# Patient Record
Sex: FEMALE | Race: BLACK OR AFRICAN AMERICAN | Employment: FULL TIME | URBAN - METROPOLITAN AREA
[De-identification: names, ages, dates, MRNs, and addresses within clinical notes are randomized per-mention and may not be internally consistent; named-entity substitution may affect disease eponyms.]

---

## 2017-11-28 ENCOUNTER — OP HISTORICAL/CONVERTED ENCOUNTER (OUTPATIENT)
Dept: OTHER | Age: 67
End: 2017-11-28

## 2018-01-24 ENCOUNTER — OP HISTORICAL/CONVERTED ENCOUNTER (OUTPATIENT)
Dept: OTHER | Age: 68
End: 2018-01-24

## 2018-04-11 ENCOUNTER — OFFICE VISIT (OUTPATIENT)
Dept: ENDOCRINOLOGY | Age: 68
End: 2018-04-11

## 2018-04-11 VITALS
RESPIRATION RATE: 18 BRPM | DIASTOLIC BLOOD PRESSURE: 71 MMHG | WEIGHT: 181.3 LBS | SYSTOLIC BLOOD PRESSURE: 135 MMHG | HEIGHT: 63 IN | OXYGEN SATURATION: 97 % | TEMPERATURE: 97.1 F | BODY MASS INDEX: 32.12 KG/M2 | HEART RATE: 76 BPM

## 2018-04-11 DIAGNOSIS — M81.0 SENILE OSTEOPOROSIS: Primary | ICD-10-CM

## 2018-04-11 DIAGNOSIS — E07.9 THYROID DISEASE: ICD-10-CM

## 2018-04-11 DIAGNOSIS — E55.9 VITAMIN D DEFICIENCY: ICD-10-CM

## 2018-04-11 RX ORDER — METFORMIN HYDROCHLORIDE 500 MG/1
TABLET, EXTENDED RELEASE ORAL
Refills: 0 | COMMUNITY
Start: 2018-04-03

## 2018-04-11 RX ORDER — AMLODIPINE BESYLATE 10 MG/1
TABLET ORAL
Refills: 1 | COMMUNITY
Start: 2018-02-09

## 2018-04-11 RX ORDER — LISINOPRIL 5 MG/1
TABLET ORAL
Refills: 0 | COMMUNITY
Start: 2018-02-09

## 2018-04-11 RX ORDER — MELOXICAM 15 MG/1
TABLET ORAL
Refills: 1 | COMMUNITY
Start: 2018-02-17 | End: 2021-07-19

## 2018-04-11 NOTE — PATIENT INSTRUCTIONS
24 hour urine collection instructions    On the day you plan to collect urine    1. Discard first urine sample soon after you get up    2. Start collecting urine samples in the container then on whole first day . Minor Ronde ... 3. until the first sample next day is collected into the jar.

## 2018-04-11 NOTE — MR AVS SNAPSHOT
[address] [id number] 
[phone] Patient: Kayla Palomo MRN: [id number] [facility]:8/55[birth year]6756 Visit Information Date & Time Provider Department Dept. Phone Encounter #  
 4/11/2018 10:00 AM Claudene Maffucci, MD [facility] [phone] [id number] Follow-up Instructions Return in about 2 months (around 6/11/2018). Upcoming Health Maintenance Date Due Hepatitis C Screening 1950 DTaP/Tdap/Td series (1 - Tdap) 3/24/1969 BREAST CANCER SCRN MAMMOGRAM 3/24/1998 FOBT Q 1 YEAR AGE 50-75 3/24/1998 ZOSTER VACCINE AGE 60> 1/24/2008 GLAUCOMA SCREENING Q2Y 3/24/2013 Bone Densitometry (Dexa) Screening 3/24/2013 Pneumococcal 65+ Low/Medium Risk (1 of 2 - PCV13) 3/24/2013 Influenza Age 5 to Adult 8/1/2017 MEDICARE YEARLY EXAM 3/14/2018 Allergies as of 4/11/2018  Review Complete On: 4/11/2018 By: Claudene Maffucci, MD  
 No Known Allergies Current Immunizations  Never Reviewed No immunizations on file. Not reviewed this visit You Were Diagnosed With   
  
 Codes Comments Senile osteoporosis    -  Primary ICD-10-CM: M81.0 ICD-9-CM: 733.01 Thyroid disease     ICD-10-CM: E07.9 ICD-9-CM: 246. 9 Vitamin D deficiency     ICD-10-CM: E55.9 ICD-9-CM: 268.9 Vitals BP Pulse Temp Resp Height(growth percentile) Weight(growth percentile) 135/71 (BP 1 Location: Right arm, BP Patient Position: Sitting) 76 97.1 °F (36.2 °C) (Oral) 18 5' 3\" (1.6 m) 181 lb 4.8 oz (82.2 kg) SpO2 BMI OB Status Smoking Status 97% 32.12 kg/m2 Postmenopausal Light Tobacco Smoker Vitals History BMI and BSA Data Body Mass Index Body Surface Area  
 32.12 kg/m 2 1.91 m 2 Your Updated Medication List  
  
   
This list is accurate as of 4/11/18 11:04 AM.  Always use your most recent med list. amLODIPine 10 mg tablet Commonly known as:  Meagan Osvaldo TAKE 1 TABLET EVERY DAY  
  
 lisinopril 5 mg tablet Commonly known as:  PRINIVIL, ZESTRIL  
TAKE 1 TABLET EVERY DAY  
  
 meloxicam 15 mg tablet Commonly known as:  MOBIC  
TAKE 1 TABLET EVERY DAY  
  
 metFORMIN  mg tablet Commonly known as:  GLUCOPHAGE XR  
TAKE 1 TABLET TWICE DAILY We Performed the Following ALKALINE PHOSPHATASE, BONE [70151 CPT(R)] IMMUNOFIXATION ELECT. URINE [OAJ7093 Custom] METABOLIC PANEL, COMPREHENSIVE [16661 CPT(R)] N-TELOPEPTIDE, URINE (SERIAL) D9855363 CPT(R)] PTH INTACT [28808 CPT(R)] THYROID PEROXIDASE (TPO) AB [35075 CPT(R)] TSH 3RD GENERATION [93362 CPT(R)] VITAMIN D, 25 HYDROXY T1522707 CPT(R)] Follow-up Instructions Return in about 2 months (around 6/11/2018). Patient Instructions 24 hour urine collection instructions On the day you plan to collect urine 1. Discard first urine sample soon after you get up 2. Start collecting urine samples in the container then on whole first day . Daylin Jennings ... 3. until the first sample next day is collected into the jar. Introducing \[facility]\"" & HEALTH SERVICES! Lisa Lau introduces [employer] patient portal. Now you can access parts of your medical record, email your doctor's office, and request medication refills online. 1. In your internet browser, go to [email or web address] 2. Click on the First Time User? Click Here link in the Sign In box. You will see the New Member Sign Up page. 3. Enter your [employer] Access Code exactly as it appears below. You will not need to use this code after youve completed the sign-up process. If you do not sign up before the expiration date, you must request a new code. · [employer] Access Code: [email or web address] Expires: 7/10/2018 10:06 AM 
 
4. Enter the last four digits of your Social Security Number (xxxx) and Date of Birth (mm/dd/yyyy) as indicated and click Submit.  You will be taken to the next sign-up page. 5. Create a EsLife ID. This will be your [employer] login ID and cannot be changed, so think of one that is secure and easy to remember. 6. Create a EsLife password. You can change your password at any time. 7. Enter your Password Reset Question and Answer. This can be used at a later time if you forget your password. 8. Enter your e-mail address. You will receive e-mail notification when new information is available in [address]. 9. Click Sign Up. You can now view and download portions of your medical record. 10. Click the Download Summary menu link to download a portable copy of your medical information. If you have questions, please visit the Frequently Asked Questions section of the [employer]Life website. Remember, [employer]Life is NOT to be used for urgent needs. For medical emergencies, dial 911. Now available from your iPhone and Android! Please provide this summary of care documentation to your next provider. If you have any questions after today's visit, please call [phone].

## 2018-04-11 NOTE — PROGRESS NOTES
HISTORY OF PRESENT ILLNESS  Sanam Boudreaux is a 77 y.o. female. HPI  Initial visit for osteoporosis management   Referred by pcp       Osteoporosis  Patient complains of osteoporosis. She was diagnosed with osteoporosis by bone density scan in nov 2017 . Patient denies history of fracture. The cause of osteoporosis is felt to be due to postmenopausal estrogen deficiency. She is currently being treated with calcium and vitamin D supplementation. She is currently being treated with bisphosphonates   Osteoporosis Risk Factors   Nonmodifiable  Personal Hx of fracture as an adult: no  Hx of fracture in first-degree relative: no   race: no  Advanced age: yes  Female sex: yes  Dementia: no  Poor health/frailty: no     Potentially modifiable:  Tobacco use: no  Low body weight (<127 lbs): no  Estrogen deficiency     early menopause (age <45) or bilateral ovariectomy: no     prolonged premenopausal amenorrhea (>1 yr): no  Low calcium intake (lifelong): no  Alcoholism: no  Recurrent falls: no  Inadequate physical activity: no          Past Medical History:   Diagnosis Date    High cholesterol     Hypertension     Osteoporosis        Social History     Social History    Marital status:      Spouse name: N/A    Number of children: N/A    Years of education: N/A     Occupational History    Not on file. Social History Main Topics    Smoking status: Light Tobacco Smoker    Smokeless tobacco: Never Used    Alcohol use Yes    Drug use: No    Sexual activity: Not on file     Other Topics Concern    Not on file     Social History Narrative    No narrative on file       History reviewed. No pertinent family history. Review of Systems   Constitutional: Negative. HENT: Negative. Eyes: Negative for pain and redness. Respiratory: Negative. Cardiovascular: Negative for chest pain, palpitations and leg swelling. Gastrointestinal: Negative. Negative for constipation. Genitourinary: Negative. Musculoskeletal: Positive for back pain, joint pain and myalgias. Skin: Negative. Neurological: Negative. Endo/Heme/Allergies: Negative. Psychiatric/Behavioral: Negative for depression and memory loss. The patient does not have insomnia. Physical Exam   Constitutional: She is oriented to person, place, and time. She appears well-developed and well-nourished. HENT:   Head: Normocephalic. Eyes: Conjunctivae and EOM are normal. Pupils are equal, round, and reactive to light. Neck: Normal range of motion. Neck supple. No JVD present. No tracheal deviation present. No thyromegaly present. Cardiovascular: Normal rate, regular rhythm and normal heart sounds. No murmur heard. Pulmonary/Chest: Breath sounds normal.   Abdominal: Soft. Bowel sounds are normal.   Musculoskeletal: Normal range of motion. Lymphadenopathy:     She has no cervical adenopathy. Neurological: She is alert and oriented to person, place, and time. She has normal reflexes. Skin: Skin is warm. Psychiatric: She has a normal mood and affect. ASSESSMENT and PLAN      1. Osteoporosis :   Osteoporosis :  Date : nov 2017  Bone DEXA  ap spine T-score -2.9; left femoral Neck T- score  -2.4, right femoral neck T-score -2.5  Date : jan 19 2012  Bone DEXA  ap spine T-score -1.5 ( L2 is -2.5); left femoral Neck T- score  -2.2, right femoral neck T-score-2.3    Discussed  Options of current available medications. Will do primary screening for secondary causes of osteoprosis (thyroid, parathyroid, hypercalciuria, vitamin D def, multiple myeloma, celiac sprue). Discussed  starting on Prolia  twice yearly once insurance approval is done. Medication benefits, side-effects and adverse effects discussed in detail with patient and family.   Patient is agreeable and started her on First shot today     Fall precautions discussed with the patient   Patient will continue on high dose supplementation of vitamin D and calcium at least 1200mg a day      Continue with calcium (1000mg - 1200mg) and vit D supplements(800iu- 1200iu) daily.        2. H/o Breast cancer diagnosed in   2009   Had radiation treatments   Tamoxifen ended 2014         > 50 % of time is spent on counseling   Patient voiced understanding her plan of care

## 2018-04-11 NOTE — PROGRESS NOTES
Wt Readings from Last 3 Encounters:   04/11/18 181 lb 4.8 oz (82.2 kg)     Temp Readings from Last 3 Encounters:   04/11/18 97.1 °F (36.2 °C) (Oral)     BP Readings from Last 3 Encounters:   04/11/18 135/71     Pulse Readings from Last 3 Encounters:   04/11/18 76

## 2018-04-13 LAB
25(OH)D3+25(OH)D2 SERPL-MCNC: 35.4 NG/ML (ref 30–100)
ALBUMIN SERPL-MCNC: 4.3 G/DL (ref 3.5–4.8)
ALBUMIN/GLOB SERPL: 1.3 {RATIO} (ref 1.2–2.2)
ALP BONE SERPL-MCNC: 12.9 UG/L
ALP SERPL-CCNC: 56 IU/L (ref 39–117)
ALT SERPL-CCNC: 18 IU/L (ref 0–32)
AST SERPL-CCNC: 20 IU/L (ref 0–40)
BILIRUB SERPL-MCNC: 0.4 MG/DL (ref 0–1.2)
BUN SERPL-MCNC: 9 MG/DL (ref 8–27)
BUN/CREAT SERPL: 16 (ref 12–28)
CALCIUM SERPL-MCNC: 9.2 MG/DL (ref 8.7–10.3)
CHLORIDE SERPL-SCNC: 103 MMOL/L (ref 96–106)
CO2 SERPL-SCNC: 30 MMOL/L (ref 18–29)
COLLAGEN NTX UR-SCNC: 423 NMOL BCE
COLLAGEN NTX/CREAT UR-SRTO: 61 NM BCE/MM CR (ref 0–89)
CREAT SERPL-MCNC: 0.57 MG/DL (ref 0.57–1)
CREAT UR-MCNC: 78.4 MG/DL
GFR SERPLBLD CREATININE-BSD FMLA CKD-EPI: 109 ML/MIN/1.73
GFR SERPLBLD CREATININE-BSD FMLA CKD-EPI: 94 ML/MIN/1.73
GLOBULIN SER CALC-MCNC: 3.2 G/DL (ref 1.5–4.5)
GLUCOSE SERPL-MCNC: 99 MG/DL (ref 65–99)
INTERPRETATION UR IFE-IMP: NORMAL
INTERPRETIVE GUIDE, 511495: NORMAL
POTASSIUM SERPL-SCNC: 3.6 MMOL/L (ref 3.5–5.2)
PROT SERPL-MCNC: 7.5 G/DL (ref 6–8.5)
PTH-INTACT SERPL-MCNC: 32 PG/ML (ref 15–65)
SODIUM SERPL-SCNC: 145 MMOL/L (ref 134–144)
THYROPEROXIDASE AB SERPL-ACNC: 204 IU/ML (ref 0–34)
TSH SERPL DL<=0.005 MIU/L-ACNC: 1.46 UIU/ML (ref 0.45–4.5)

## 2018-04-17 NOTE — PROGRESS NOTES
Informed patient that she has all the labs which have come back to be normal except for thyroid antibodies which are generally present at certain level in all of us.    so,  none to worry about

## 2018-04-23 ENCOUNTER — TELEPHONE (OUTPATIENT)
Dept: ENDOCRINOLOGY | Age: 68
End: 2018-04-23

## 2018-06-13 ENCOUNTER — OFFICE VISIT (OUTPATIENT)
Dept: ENDOCRINOLOGY | Age: 68
End: 2018-06-13

## 2018-06-13 VITALS
BODY MASS INDEX: 31.96 KG/M2 | SYSTOLIC BLOOD PRESSURE: 142 MMHG | OXYGEN SATURATION: 98 % | WEIGHT: 180.4 LBS | HEIGHT: 63 IN | RESPIRATION RATE: 18 BRPM | DIASTOLIC BLOOD PRESSURE: 72 MMHG | TEMPERATURE: 97.3 F | HEART RATE: 78 BPM

## 2018-06-13 DIAGNOSIS — C50.912 MALIGNANT NEOPLASM OF LEFT FEMALE BREAST, UNSPECIFIED ESTROGEN RECEPTOR STATUS, UNSPECIFIED SITE OF BREAST (HCC): ICD-10-CM

## 2018-06-13 DIAGNOSIS — M81.0 SENILE OSTEOPOROSIS: Primary | ICD-10-CM

## 2018-06-13 DIAGNOSIS — R82.994 HYPERCALCIURIA: ICD-10-CM

## 2018-06-13 RX ORDER — HYDROCHLOROTHIAZIDE 25 MG/1
25 TABLET ORAL DAILY
Qty: 30 TAB | Refills: 6 | Status: SHIPPED | OUTPATIENT
Start: 2018-06-13 | End: 2019-01-14 | Stop reason: SDUPTHER

## 2018-06-13 NOTE — MR AVS SNAPSHOT
[address] [facility] [address] [place] [address] [facility] [address] 
[phone] Patient: Margy Mccain MRN: [id number] LBY:[birth year] Visit Information Date & Time Provider Department Dept. Phone Encounter #  
 6/13/2018 10:00 AM Mao Nicole MD [facility] [phone] [id number] Follow-up Instructions Return for [address] 2018 . Your Appointments 10/10/2018 10:00 AM  
IMMUNIZATIONS/INJECTIONS with Mao Nicole MD  
[facility] Appt Note: Prolia injection [address] [address] [place] [address] [facility] [address]  
[phone]  
  
   
 [address] [place] [address] [address] [facility] [address] Upcoming Health Maintenance Date Due Hepatitis C Screening 1950 DTaP/Tdap/Td series (1 - Tdap) 3/24/1969 BREAST CANCER SCRN MAMMOGRAM 3/24/1998 FOBT Q 1 YEAR AGE 50-75 3/24/1998 ZOSTER VACCINE AGE 60> 1/24/2008 GLAUCOMA SCREENING Q2Y 3/24/2013 Pneumococcal 65+ Low/Medium Risk (1 of 2 - PCV13) 3/24/2013 Influenza Age 5 to Adult 8/1/2018 Allergies as of 6/13/2018  Review Complete On: 6/13/2018 By: Mao Nicole MD  
 No Known Allergies Current Immunizations  Never Reviewed No immunizations on file. Not reviewed this visit You Were Diagnosed With   
  
 Codes Comments Senile osteoporosis    -  Primary ICD-10-CM: M81.0 ICD-9-CM: 733.01 Hypercalciuria     ICD-10-CM: E83.50 ICD-9-CM: 275.40 Malignant neoplasm of left female breast, unspecified estrogen receptor status, unspecified site of breast [facility]     ICD-10-CM: X67.501 ICD-9-CM: 174.9 Vitals BP Pulse Temp Resp Height(growth percentile) Weight(growth percentile) 142/72 (BP 1 Location: Left arm, BP Patient Position: Sitting) 78 97.3 °F (36.3 °C) (Oral) 18 5' 3\" (1.6 m) 180 lb 6.4 oz (81.8 kg) SpO2 BMI OB Status Smoking Status 98% 31.96 kg/m2 Postmenopausal Light Tobacco Smoker Vitals History BMI and BSA Data Body Mass Index Body Surface Area 31.96 kg/m 2 1.91 m 2 Preferred Pharmacy Pharmacy Name Phone [facility]/PHARMACY #[id number] NABILA Blackmon  [place] [address] [phone] Your Updated Medication List  
  
   
This list is accurate as of 6/13/18 10:38 AM.  Always use your most recent med list. amLODIPine 10 mg tablet Commonly known as:  Ermias Emerald TAKE 1 TABLET EVERY DAY  
  
 hydroCHLOROthiazide 25 mg tablet Commonly known as:  HYDRODIURIL Take 1 Tab by mouth daily. lisinopril 5 mg tablet Commonly known as:  PRINIVIL, ZESTRIL  
TAKE 1 TABLET EVERY DAY  
  
 meloxicam 15 mg tablet Commonly known as:  MOBIC  
TAKE 1 TABLET EVERY DAY  
  
 metFORMIN  mg tablet Commonly known as:  GLUCOPHAGE XR  
TAKE 1 TABLET TWICE DAILY Prescriptions Sent to Pharmacy Refills  
 hydroCHLOROthiazide (HYDRODIURIL) 25 mg tablet 6 Sig: Take 1 Tab by mouth daily. Class: Normal  
 Pharmacy: [facility]/pharmacy #[id number] [place], [facility] [address] [place] [address] Ph #: [phone] Route: Oral  
  
Follow-up Instructions Return for [address] 2018 . Patient Instructions   
-------------------------------------------------------------------------------------------- Refills    -    please call your pharmacy and have them send us a refill request 
 
Results  -  allow up to a week for lab results to be processed and reviewed. Phone calls  -  Allow upto 24 hrs. for non-urgent calls to be retained Prior authorization - It may take up to 2 weeks to process, depending on your insurance Forms  -  FMLA, DMV, patient assistance, etc. will take up to 2 weeks to process Cancellations - please notify the office in advance if you cannot keep your appointment Samples  - will only be dispensed at visits as supply is limited If you are having a medical emergency call 911 
 
-------------------------------------------------------------------------------------------- 
 
 
START ON HCTZ 25 MG A DAY 24 hour urine collection instructions FOR CALCIUM, CREATITNEN AND SODIUM On the day you plan to collect urine 1. Discard first urine sample soon after you get up 2. Start collecting urine samples in the container then on whole first day . Jason Morris ... 3. until the first sample next day is collected into the jar. Introducing [facility] & HEALTH SERVICES! Justin Leal introduces [employer] patient portal. Now you can access parts of your medical record, email your doctor's office, and request medication refills online. 1. In your internet browser, go to [email or web address] 2. Click on the First Time User? Click Here link in the Sign In box. You will see the New Member Sign Up page. 3. Enter your [employer] Access Code exactly as it appears below. You will not need to use this code after youve completed the sign-up process. If you do not sign up before the expiration date, you must request a new code. · [employer] Access Code: [email or web address] Expires: 7/10/2018 10:06 AM 
 
4. Enter the last four digits of your Social Security Number (xxxx) and Date of Birth (mm/dd/yyyy) as indicated and click Submit. You will be taken to the next sign-up page. 5. Create a [employer] ID. This will be your [employer] login ID and cannot be changed, so think of one that is secure and easy to remember. 6. Create a [employer] password. You can change your password at any time. 7. Enter your Password Reset Question and Answer. This can be used at a later time if you forget your password. 8. Enter your e-mail address. You will receive e-mail notification when new information is available in [address] 9. Click Sign Up. You can now view and download portions of your medical record. 10. Click the Download Summary menu link to download a portable copy of your medical information. If you have questions, please visit the Frequently Asked Questions section of the [employer] website. Remember, [employer] is NOT to be used for urgent needs. For medical emergencies, dial 911. Now available from your iPhone and Android! Please provide this summary of care documentation to your next provider. Your primary care clinician is listed as Geovani Banerjee. If you have any questions after today's visit, please call [phone].

## 2018-06-13 NOTE — LETTER
6/13/2018 7:38 PM 
 
Patient:  rBady Garibay YOB: 1950 Date of Visit: 6/13/2018 Dear Jennifer Galan MD 
[address] [place] [address] VIA Facsimile: [phone] 
 : Thank you for referring Ms. Trixie Vivas to me for evaluation/treatment. Below are the relevant portions of my assessment and plan of care. Wt Readings from Last 3 Encounters:  
06/13/18 180 lb 6.4 oz (81.8 kg) 04/11/18 181 lb 4.8 oz (82.2 kg) Temp Readings from Last 3 Encounters:  
06/13/18 97.3 °F (36.3 °C) (Oral) 04/11/18 97.1 °F (36.2 °C) (Oral) BP Readings from Last 3 Encounters:  
06/13/18 142/72  
04/11/18 135/71 Pulse Readings from Last 3 Encounters:  
06/13/18 78  
04/11/18 76 HISTORY OF PRESENT ILLNESS Brady Garibay is a 77 y.o. female. HPI First f/u after  Last Initial visit for osteoporosis management   From April 11 2018 Tolerated Prolia shot well Did the 24 hr urine testing Old history :  
Referred by pcp Osteoporosis Patient complains of osteoporosis. She was diagnosed with osteoporosis by bone density scan in nov 2017 . Patient denies history of fracture. The cause of osteoporosis is felt to be due to postmenopausal estrogen deficiency. She is currently being treated with calcium and vitamin D supplementation. She is currently being treated with bisphosphonates Osteoporosis Risk Factors Nonmodifiable Personal Hx of fracture as an adult: no 
Hx of fracture in first-degree relative: no 
 race: no 
Advanced age: yes Female sex: yes Dementia: no 
Poor health/frailty: no 
  
Potentially modifiable: 
Tobacco use: no Low body weight (<127 lbs): no 
Estrogen deficiency 
   early menopause (age <45) or bilateral ovariectomy: no 
   prolonged premenopausal amenorrhea (>1 yr): no 
Low calcium intake (lifelong): no 
Alcoholism: no 
Recurrent falls: no Inadequate physical activity: no 
 
 
 
 
Past Medical History: Diagnosis Date  High cholesterol  Hypertension  Osteoporosis Social History Social History  Marital status:  Spouse name: N/A  
 Number of children: N/A  
 Years of education: N/A Occupational History  Not on file. Social History Main Topics  Smoking status: Light Tobacco Smoker  Smokeless tobacco: Never Used  Alcohol use Yes  Drug use: No  
 Sexual activity: Not on file Other Topics Concern  Not on file Social History Narrative History reviewed. No pertinent family history. Review of Systems Constitutional: Negative. HENT: Negative. Eyes: Negative for pain and redness. Respiratory: Negative. Cardiovascular: Negative for chest pain, palpitations and leg swelling. Gastrointestinal: Negative. Negative for constipation. Genitourinary: Negative. Musculoskeletal: Positive for back pain, joint pain and myalgias. Skin: Negative. Neurological: Negative. Endo/Heme/Allergies: Negative. Psychiatric/Behavioral: Negative for depression and memory loss. The patient does not have insomnia. Physical Exam  
Constitutional: She is oriented to person, place, and time. She appears well-developed and well-nourished. HENT:  
Head: Normocephalic. Eyes: Conjunctivae and EOM are normal. Pupils are equal, round, and reactive to light. Neck: Normal range of motion. Neck supple. No JVD present. No tracheal deviation present. thyromegaly present. Cardiovascular: Normal rate, regular rhythm and normal heart sounds. No murmur heard. Pulmonary/Chest: Breath sounds normal.  
Abdominal: Soft. Bowel sounds are normal.  
Musculoskeletal: Normal range of motion. Lymphadenopathy:  
  She has no cervical adenopathy. Neurological: She is alert and oriented to person, place, and time. She has normal reflexes. Skin: Skin is warm. Psychiatric: She has a normal mood and affect. Reviewed labs Has 24 hr urine  Calcium - over 400 mg  
 
ASSESSMENT and PLAN 
 
 
1. Osteoporosis :  
Osteoporosis :  Date : nov 2017  Bone DEXA  ap spine T-score -2.9; left femoral Neck T- score  -2.4, right femoral neck T-score -2.5 Date : jan 19 2012  Bone DEXA  ap spine T-score -1.5 ( L2 is -2.5); left femoral Neck T- score  -2.2, right femoral neck T-score-2.3 AGE, tamoxifen ,AND HYPERCALCIURIA Discussed  Options of current available medications. DID primary screening for secondary causes of osteopOrosis (thyroid, parathyroid,  CAME POSITIVE FOR hypercalciuria, vitamin D def, multiple myeloma, celiac sprue). Discussed  starting on Prolia  twice yearly once insurance approval is done. Medication benefits, side-effects and adverse effects discussed in detail with patient and family. Patient is agreeable and started her on First shot today Fall precautions discussed with the patient Patient will continue on high dose supplementation of vitamin D and calcium at least 1200mg a day Continue with calcium (1000mg - 1200mg) and vit D supplements(800iu- 1200iu) daily. 2. H/o Breast cancer, left side  diagnosed in   2009 Had radiation treatments and lumpectomy Tamoxifen ended 2014  
 
 
3. HYPERCALCIURIA  : MORE THAN 400 IN 24 HOURS  
START ON HCTZ 25 MG A DAY Will repeat 24 hr calcium after 2 months on hctz ASKED HER TO REDUCE NORVASC OR STOP IT IF SHE FELT DIZZY 4. Thyromegaly - will do an usg 5. DM 2 - managed by pcp  
 
 
> 50 % of time is spent on counseling Patient voiced understanding her plan of care If you have questions, please do not hesitate to call me. I look forward to following Ms. Carmelita Monique along with you. Sincerely, Nader Chappell MD

## 2018-06-13 NOTE — PATIENT INSTRUCTIONS
--------------------------------------------------------------------------------------------    Refills    -    please call your pharmacy and have them send us a refill request    Results  -  allow up to a week for lab results to be processed and reviewed. Phone calls  -  Allow upto 24 hrs. for non-urgent calls to be retained    Prior authorization - It may take up to 2 weeks to process, depending on your insurance    Forms  -  FMLA, DMV, patient assistance, etc. will take up to 2 weeks to process    Cancellations - please notify the office in advance if you cannot keep your appointment    Samples  - will only be dispensed at visits as supply is limited      If you are having a medical emergency call 911    --------------------------------------------------------------------------------------------      START ON HCTZ 25 MG A DAY       24 hour urine collection instructions FOR CALCIUM, CREATITNEN AND SODIUM     On the day you plan to collect urine    1. Discard first urine sample soon after you get up    2. Start collecting urine samples in the container then on whole first day . Baxter Dials ... 3. until the first sample next day is collected into the jar.

## 2018-06-13 NOTE — PROGRESS NOTES
HISTORY OF PRESENT ILLNESS  Xu Willams is a 77 y.o. female. HPI  First f/u after  Last Initial visit for osteoporosis management   From April 11 2018     Tolerated Prolia shot well   Did the 24 hr urine testing         Old history :   Referred by pcp       Osteoporosis  Patient complains of osteoporosis. She was diagnosed with osteoporosis by bone density scan in nov 2017 . Patient denies history of fracture. The cause of osteoporosis is felt to be due to postmenopausal estrogen deficiency. She is currently being treated with calcium and vitamin D supplementation. She is currently being treated with bisphosphonates   Osteoporosis Risk Factors   Nonmodifiable  Personal Hx of fracture as an adult: no  Hx of fracture in first-degree relative: no   race: no  Advanced age: yes  Female sex: yes  Dementia: no  Poor health/frailty: no     Potentially modifiable:  Tobacco use: no  Low body weight (<127 lbs): no  Estrogen deficiency     early menopause (age <45) or bilateral ovariectomy: no     prolonged premenopausal amenorrhea (>1 yr): no  Low calcium intake (lifelong): no  Alcoholism: no  Recurrent falls: no  Inadequate physical activity: no          Past Medical History:   Diagnosis Date    High cholesterol     Hypertension     Osteoporosis        Social History     Social History    Marital status:      Spouse name: N/A    Number of children: N/A    Years of education: N/A     Occupational History    Not on file. Social History Main Topics    Smoking status: Light Tobacco Smoker    Smokeless tobacco: Never Used    Alcohol use Yes    Drug use: No    Sexual activity: Not on file     Other Topics Concern    Not on file     Social History Narrative       History reviewed. No pertinent family history. Review of Systems   Constitutional: Negative. HENT: Negative. Eyes: Negative for pain and redness. Respiratory: Negative.     Cardiovascular: Negative for chest pain, palpitations and leg swelling. Gastrointestinal: Negative. Negative for constipation. Genitourinary: Negative. Musculoskeletal: Positive for back pain, joint pain and myalgias. Skin: Negative. Neurological: Negative. Endo/Heme/Allergies: Negative. Psychiatric/Behavioral: Negative for depression and memory loss. The patient does not have insomnia. Physical Exam   Constitutional: She is oriented to person, place, and time. She appears well-developed and well-nourished. HENT:   Head: Normocephalic. Eyes: Conjunctivae and EOM are normal. Pupils are equal, round, and reactive to light. Neck: Normal range of motion. Neck supple. No JVD present. No tracheal deviation present. thyromegaly present. Cardiovascular: Normal rate, regular rhythm and normal heart sounds. No murmur heard. Pulmonary/Chest: Breath sounds normal.   Abdominal: Soft. Bowel sounds are normal.   Musculoskeletal: Normal range of motion. Lymphadenopathy:     She has no cervical adenopathy. Neurological: She is alert and oriented to person, place, and time. She has normal reflexes. Skin: Skin is warm. Psychiatric: She has a normal mood and affect. Reviewed labs   Has 24 hr urine  Calcium - over 400 mg     ASSESSMENT and PLAN      1. Osteoporosis :   Osteoporosis :  Date : nov 2017  Bone DEXA  ap spine T-score -2.9; left femoral Neck T- score  -2.4, right femoral neck T-score -2.5  Date : jan 19 2012  Bone DEXA  ap spine T-score -1.5 ( L2 is -2.5); left femoral Neck T- score  -2.2, right femoral neck T-score-2.3    AGE, tamoxifen ,AND HYPERCALCIURIA      Discussed  Options of current available medications. DID primary screening for secondary causes of osteopOrosis (thyroid, parathyroid,  CAME POSITIVE FOR hypercalciuria, vitamin D def, multiple myeloma, celiac sprue). Discussed  starting on Prolia  twice yearly once insurance approval is done.  Medication benefits, side-effects and adverse effects discussed in detail with patient and family. Patient is agreeable and started her on First shot today     Fall precautions discussed with the patient   Patient will continue on high dose supplementation of vitamin D and calcium at least 1200mg a day      Continue with calcium (1000mg - 1200mg) and vit D supplements(800iu- 1200iu) daily. 2. H/o Breast cancer, left side  diagnosed in   2009   Had radiation treatments and lumpectomy   Tamoxifen ended 2014       3. HYPERCALCIURIA  : MORE THAN 400 IN 24 HOURS   START ON HCTZ 25 MG A DAY   Will repeat 24 hr calcium after 2 months on hctz  ASKED HER TO REDUCE NORVASC OR STOP IT IF SHE FELT DIZZY       4. Thyromegaly - will do an usg     5.   DM 2 - managed by pcp       > 50 % of time is spent on counseling   Patient voiced understanding her plan of care

## 2018-06-13 NOTE — PROGRESS NOTES
Wt Readings from Last 3 Encounters:   06/13/18 180 lb 6.4 oz (81.8 kg)   04/11/18 181 lb 4.8 oz (82.2 kg)     Temp Readings from Last 3 Encounters:   06/13/18 97.3 °F (36.3 °C) (Oral)   04/11/18 97.1 °F (36.2 °C) (Oral)     BP Readings from Last 3 Encounters:   06/13/18 142/72   04/11/18 135/71     Pulse Readings from Last 3 Encounters:   06/13/18 78   04/11/18 76

## 2018-08-29 ENCOUNTER — TELEPHONE (OUTPATIENT)
Dept: ENDOCRINOLOGY | Age: 68
End: 2018-08-29

## 2018-08-29 NOTE — TELEPHONE ENCOUNTER
Dorothy called stated prolia for patient needs to be done Medically in order to pay. Any questions call dorothy back.  Thank you

## 2018-08-30 ENCOUNTER — DOCUMENTATION ONLY (OUTPATIENT)
Dept: ENDOCRINOLOGY | Age: 68
End: 2018-08-30

## 2018-08-30 NOTE — PROGRESS NOTES
Prolia approved 09/19/18-09/0919 case #[id number]. This had to be approved through medical benefits with medical management. Medication will need to be scheduled for shipment on or after 09/19/18 and the patient's appointment is for 10/11/18.

## 2018-09-25 ENCOUNTER — DOCUMENTATION ONLY (OUTPATIENT)
Dept: ENDOCRINOLOGY | Age: 68
End: 2018-09-25

## 2018-10-02 ENCOUNTER — TELEPHONE (OUTPATIENT)
Dept: ENDOCRINOLOGY | Age: 68
End: 2018-10-02

## 2018-10-02 NOTE — TELEPHONE ENCOUNTER
Contacted [facility]([phone]) and gave information regarding PROLIA. Just need to fax script to [phone]. After which they will see if patient is eligible based on her benefits. They will then call patient and then us to set up delivery. The process takes a few days.  Shipment is sent via UPS overnight,

## 2018-10-02 NOTE — TELEPHONE ENCOUNTER
Contacted [facility]; representative stated that they do not have any info in system for Prolia injection.

## 2018-10-03 ENCOUNTER — TELEPHONE (OUTPATIENT)
Dept: ENDOCRINOLOGY | Age: 68
End: 2018-10-03

## 2018-10-03 DIAGNOSIS — M81.0 SENILE OSTEOPOROSIS: Primary | ICD-10-CM

## 2018-10-03 DIAGNOSIS — E01.0 THYROMEGALY: ICD-10-CM

## 2018-10-04 LAB
25(OH)D3+25(OH)D2 SERPL-MCNC: 38.5 NG/ML (ref 30–100)
ALBUMIN SERPL-MCNC: 4.1 G/DL (ref 3.5–4.8)
ALBUMIN/GLOB SERPL: 1.2 {RATIO} (ref 1.2–2.2)
ALP SERPL-CCNC: 49 IU/L (ref 39–117)
ALT SERPL-CCNC: 14 IU/L (ref 0–32)
AST SERPL-CCNC: 16 IU/L (ref 0–40)
BILIRUB SERPL-MCNC: 0.3 MG/DL (ref 0–1.2)
BUN SERPL-MCNC: 13 MG/DL (ref 8–27)
BUN/CREAT SERPL: 21 (ref 12–28)
CALCIUM SERPL-MCNC: 8.8 MG/DL (ref 8.7–10.3)
CHLORIDE SERPL-SCNC: 103 MMOL/L (ref 96–106)
CO2 SERPL-SCNC: 25 MMOL/L (ref 20–29)
CREAT SERPL-MCNC: 0.61 MG/DL (ref 0.57–1)
GLOBULIN SER CALC-MCNC: 3.4 G/DL (ref 1.5–4.5)
GLUCOSE SERPL-MCNC: 121 MG/DL (ref 65–99)
POTASSIUM SERPL-SCNC: 3.6 MMOL/L (ref 3.5–5.2)
PROT SERPL-MCNC: 7.5 G/DL (ref 6–8.5)
PTH-INTACT SERPL-MCNC: 44 PG/ML (ref 15–65)
SODIUM SERPL-SCNC: 143 MMOL/L (ref 134–144)
T4 FREE SERPL-MCNC: 0.77 NG/DL (ref 0.82–1.77)
THYROPEROXIDASE AB SERPL-ACNC: 192 IU/ML (ref 0–34)
TSH SERPL DL<=0.005 MIU/L-ACNC: 0.88 UIU/ML (ref 0.45–4.5)

## 2018-10-08 ENCOUNTER — DOCUMENTATION ONLY (OUTPATIENT)
Dept: ENDOCRINOLOGY | Age: 68
End: 2018-10-08

## 2018-10-08 ENCOUNTER — TELEPHONE (OUTPATIENT)
Dept: ENDOCRINOLOGY | Age: 68
End: 2018-10-08

## 2018-10-08 DIAGNOSIS — C50.912 MALIGNANT NEOPLASM OF LEFT FEMALE BREAST, UNSPECIFIED ESTROGEN RECEPTOR STATUS, UNSPECIFIED SITE OF BREAST (HCC): ICD-10-CM

## 2018-10-08 DIAGNOSIS — M81.0 SENILE OSTEOPOROSIS: ICD-10-CM

## 2018-10-08 DIAGNOSIS — R82.994 HYPERCALCIURIA: ICD-10-CM

## 2018-10-08 NOTE — PROGRESS NOTES
[facility] was transfer to a rep who informed writer that pts medical benefit need to be checked along with pharmacy then they will call patient about co pay and then us about shipment and deliveryday. It should take a day or two per representaive.

## 2018-10-08 NOTE — TELEPHONE ENCOUNTER
Call placed to [facility] [phone] regarding Prolia injection. Representative stated they never received prescription via fax. Prescription faxed again to [phone] per representative. Rep states that they will reach out to patient and then we will hear from [facility].

## 2018-10-08 NOTE — PROGRESS NOTES
CONTACTED patient in regards to PROLIA-pt states that she received a letter from [facility] stating she is good for 2doses of prolia from 9/2018 till 9/2019. Ms Charmaine Hughes states she will bring letter with her on her appointment.  Patient states she has not received any phone calls that she is aware

## 2018-10-09 LAB
CALCIUM 24H UR-MCNC: 15.3 MG/DL
CALCIUM 24H UR-MRATE: 214.2 MG/24 HR (ref 100–300)
CREAT 24H UR-MRATE: 965 MG/24 HR (ref 800–1800)
CREAT UR-MCNC: 68.9 MG/DL
SODIUM 24H UR-SCNC: 128 MMOL/L
SODIUM 24H UR-SRATE: 179 MMOL/24 HR (ref 39–258)

## 2018-10-10 ENCOUNTER — TELEPHONE (OUTPATIENT)
Dept: ENDOCRINOLOGY | Age: 68
End: 2018-10-10

## 2018-10-10 NOTE — TELEPHONE ENCOUNTER
Specialty pharmacy states they must get in contact with pt in order to ship prolia. Tried to reach pt. Left message for a return phone call.  If unable to reach pt, she will not be able to receive prolia injection at appointment tomorrow

## 2018-10-10 NOTE — TELEPHONE ENCOUNTER
Please contact Lele Melvin with [facility] specialty pharmacy at   [phone] ext. [phone]    Re: Nancie Kincaid

## 2018-10-11 ENCOUNTER — OFFICE VISIT (OUTPATIENT)
Dept: ENDOCRINOLOGY | Age: 68
End: 2018-10-11

## 2018-10-11 VITALS
DIASTOLIC BLOOD PRESSURE: 76 MMHG | TEMPERATURE: 97.3 F | SYSTOLIC BLOOD PRESSURE: 130 MMHG | HEIGHT: 63 IN | HEART RATE: 76 BPM | BODY MASS INDEX: 31.41 KG/M2 | WEIGHT: 177.3 LBS | RESPIRATION RATE: 18 BRPM | OXYGEN SATURATION: 97 %

## 2018-10-11 DIAGNOSIS — M81.0 SENILE OSTEOPOROSIS: Primary | ICD-10-CM

## 2018-10-11 DIAGNOSIS — R82.994 HYPERCALCIURIA: ICD-10-CM

## 2018-10-11 DIAGNOSIS — E04.9 THYROID GOITER: ICD-10-CM

## 2018-10-11 RX ORDER — CALCIUM CARBONATE/VITAMIN D3 600MG-5MCG
TABLET ORAL
COMMUNITY

## 2018-10-11 NOTE — PROGRESS NOTES
Diagnostic thyroid Ultrasound   Date : 10/11/2018      Real time usg of thyroid gland was performed in both transverse and sagittal planes    Right thyroid lobe: homogenous, measuring 3.8 cm by 2.6cm by 1.76 cm  Left thyroid lobe :  Homogenous, Measuring 3.86cm by 2.22 cm  By 1.59 cm  Isthmus: 5 mm    Right thyroid lobe showed 2-3  nodules   # 1 nodule of size at right superior pole of size 1.37 cm by 1.32 cm by 1 cm, regular border, hypoechoic, not vascular   # 2 nodule of size 1.98  cm by 0.58 cm by 2.4cm regular border, hypoechoic, not vascular     Left thyroid lobe showed one nodules   # 1 nodule of size 1.48 cm by 0.62 cm by 0.5 cm      Isthmus showed no nodules.

## 2018-10-11 NOTE — TELEPHONE ENCOUNTER
Wanted a finishing touch to this whole \"back and forth\"    I called [facility] pharmacy myself and found out that the copay for her is around 700 $   Obviously it is too high     Pt was given  prolia copay assistance info to call to get price down to 25 $     Hoping she will call with card in hand and [facility] needs that info to update their account and then they ship the medication     Enoch Méndez MD

## 2018-10-11 NOTE — PROGRESS NOTES
HISTORY OF PRESENT ILLNESS  Braulio Ibarra is a 77 y.o. female. HPI   f/u after  Last Initial visit for osteoporosis management   From June 2018     Tolerated Prolia shot well  In April 2018   Supposed to get prolia shot         Old history :   Referred by pcp       Osteoporosis  Patient complains of osteoporosis. She was diagnosed with osteoporosis by bone density scan in nov 2017 . Patient denies history of fracture. The cause of osteoporosis is felt to be due to postmenopausal estrogen deficiency. She is currently being treated with calcium and vitamin D supplementation. She is currently being treated with bisphosphonates   Osteoporosis Risk Factors   Nonmodifiable  Personal Hx of fracture as an adult: no  Hx of fracture in first-degree relative: no   race: no  Advanced age: yes  Female sex: yes  Dementia: no  Poor health/frailty: no     Potentially modifiable:  Tobacco use: no  Low body weight (<127 lbs): no  Estrogen deficiency     early menopause (age <45) or bilateral ovariectomy: no     prolonged premenopausal amenorrhea (>1 yr): no  Low calcium intake (lifelong): no  Alcoholism: no  Recurrent falls: no  Inadequate physical activity: no          Past Medical History:   Diagnosis Date    High cholesterol     Hypertension     Osteoporosis        Social History     Social History    Marital status:      Spouse name: N/A    Number of children: N/A    Years of education: N/A     Occupational History    Not on file. Social History Main Topics    Smoking status: Light Tobacco Smoker    Smokeless tobacco: Never Used    Alcohol use Yes    Drug use: No    Sexual activity: Not on file     Other Topics Concern    Not on file     Social History Narrative       History reviewed. No pertinent family history. Review of Systems   Constitutional: Negative. HENT: Negative. Eyes: Negative for pain and redness. Respiratory: Negative.     Cardiovascular: Negative for chest pain, palpitations and leg swelling. Gastrointestinal: Negative. Negative for constipation. Genitourinary: Negative. Musculoskeletal: Positive for back pain, joint pain and myalgias. Skin: Negative. Neurological: Negative. Endo/Heme/Allergies: Negative. Psychiatric/Behavioral: Negative for depression and memory loss. The patient does not have insomnia. Physical Exam   Constitutional: She is oriented to person, place, and time. She appears well-developed and well-nourished. HENT:   Head: Normocephalic. Eyes: Conjunctivae and EOM are normal. Pupils are equal, round, and reactive to light. Neck: Normal range of motion. Neck supple. No JVD present. No tracheal deviation present. thyromegaly present. Cardiovascular: Normal rate, regular rhythm and normal heart sounds. No murmur heard. Pulmonary/Chest: Breath sounds normal.   Abdominal: Soft. Bowel sounds are normal.   Musculoskeletal: Normal range of motion. Lymphadenopathy:     She has no cervical adenopathy. Neurological: She is alert and oriented to person, place, and time. She has normal reflexes. Skin: Skin is warm. Psychiatric: She has a normal mood and affect. Reviewed labs   Has 24 hr urine  Calcium - over 400 mg     ASSESSMENT and PLAN      1. Osteoporosis :   Osteoporosis :  Date : nov 2017  Bone DEXA  ap spine T-score -2.9; left femoral Neck T- score  -2.4, right femoral neck T-score -2.5  Date : jan 19 2012  Bone DEXA  ap spine T-score -1.5 ( L2 is -2.5); left femoral Neck T- score  -2.2, right femoral neck T-score-2.3    AGE, tamoxifen AND HYPERCALCIURIA      She finished  primary screening for secondary causes of osteopOrosis (thyroid, parathyroid,  CAME POSITIVE FOR hypercalciuria, vitamin D def, multiple myeloma, celiac sprue). Discussed  starting on Prolia  twice yearly once insurance approval is done.  Medication benefits, side-effects and adverse effects discussed in detail with patient and family. Patient is agreeable and started her on First shot today April 2018   She is due for second shot today  . ....... [place] but she has to accept     Fall precautions discussed with the patient   Patient will continue on high dose supplementation of vitamin D and calcium at least 1200mg a day      Continue with calcium (1000mg - 1200mg) and vit D supplements(800iu- 1200iu) daily. 2. H/o Breast cancer, left side  diagnosed in   2009   Had radiation treatments and lumpectomy   Tamoxifen ended 2014       3. HYPERCALCIURIA  : MORE THAN 400 IN 25 HOURS  In June 2018   STARTed ON HCTZ 25 MG A DAY  In June 2018   On  repeat 24 hr calcium after 2 months on hctz, great success with urine calcium to 214  From over 450   ASKED HER TO REDUCE NORVASC OR STOP IT IF SHE FELT DIZZY   But she is taking them all       4. Thyromegaly - will do an usg today in office       5.   DM 2 - managed by pcp       > 50 % of time is spent on counseling   Patient voiced understanding her plan of care

## 2018-10-11 NOTE — PROGRESS NOTES
Jenn Jeffries is a 77 y.o. female    No chief complaint on file. 1. Have you been to the ER, urgent care clinic since your last visit? Hospitalized since your last visit? No  M  2. Have you seen or consulted any other health care providers outside of the [address] [place] [address] since your last visit? Include any pap smears or colon screening.  No      Visit Vitals    Ht 5' 3\" (1.6 m)    Wt 177 lb 4.8 oz (80.4 kg)    BMI 31.41 kg/m2           Health Maintenance Due   Topic Date Due    Hepatitis C Screening  1950    DTaP/Tdap/Td series (1 - Tdap) 03/24/1969    Shingrix Vaccine Age 50> (1 of 2) 03/24/1998    BREAST CANCER SCRN MAMMOGRAM  03/24/1998    FOBT Q 1 YEAR AGE 50-75  03/24/1998    GLAUCOMA SCREENING Q2Y  03/24/2013    Pneumococcal 65+ High/Highest Risk (1 of 2 - PCV13) 03/24/2013    Influenza Age 9 to Adult  08/01/2018     Wt Readings from Last 3 Encounters:   10/11/18 177 lb 4.8 oz (80.4 kg)   06/13/18 180 lb 6.4 oz (81.8 kg)   04/11/18 181 lb 4.8 oz (82.2 kg)     Temp Readings from Last 3 Encounters:   06/13/18 97.3 °F (36.3 °C) (Oral)   04/11/18 97.1 °F (36.2 °C) (Oral)     BP Readings from Last 3 Encounters:   06/13/18 142/72   04/11/18 135/71     Pulse Readings from Last 3 Encounters:   06/13/18 78   04/11/18 76

## 2018-10-11 NOTE — MR AVS SNAPSHOT
[facility] [address] 
[phone] Patient: Jenn Jeffries MRN: [id number] DPO:8/86/2899 Visit Information Date & Time Provider Department Dept. Phone Encounter #  
 10/11/2018  9:30 AM Lily Armstrong MD [facility] [phone] [id number] Upcoming Health Maintenance Date Due Hepatitis C Screening 1950 DTaP/Tdap/Td series (1 - Tdap) 3/24/1969 Shingrix Vaccine Age 50> (1 of 2) 3/24/1998 BREAST CANCER SCRN MAMMOGRAM 3/24/1998 FOBT Q 1 YEAR AGE 50-75 3/24/1998 GLAUCOMA SCREENING Q2Y 3/24/2013 Pneumococcal 65+ High/Highest Risk (1 of 2 - PCV13) 3/24/2013 Influenza Age 5 to Adult 8/1/2018 Allergies as of 10/11/2018  Review Complete On: 10/11/2018 By: Lily Armstrong MD  
  
 Severity Noted Reaction Type Reactions Alendronate  06/20/2018    Nausea and Vomiting Current Immunizations  Never Reviewed No immunizations on file. Not reviewed this visit You Were Diagnosed With   
  
 Codes Comments Thyroid goiter    -  Primary ICD-10-CM: E04.9 ICD-9-CM: 240.9 Senile osteoporosis     ICD-10-CM: M81.0 ICD-9-CM: 733.01 Hypercalciuria     ICD-10-CM: R82.994 ICD-9-CM: 275.40 Vitals BP Pulse Temp Resp Height(growth percentile) Weight(growth percentile) 130/76 (BP 1 Location: Left arm, BP Patient Position: Sitting) 76 97.3 °F (36.3 °C) (Oral) 18 5' 3\" (1.6 m) 177 lb 4.8 oz (80.4 kg) SpO2 BMI OB Status Smoking Status 97% 31.41 kg/m2 Postmenopausal Light Tobacco Smoker Vitals History BMI and BSA Data Body Mass Index Body Surface Area  
 31.41 kg/m 2 1.89 m 2 Preferred Pharmacy Pharmacy Name Phone [id number] [place] [facility] [address] [place] [address] [phone] Your Updated Medication List  
  
   
This list is accurate as of 10/11/18 10:17 AM.  Always use your most recent med list.  
  
 amLODIPine 10 mg tablet Commonly known as:  Sukhjinder Vasquez TAKE 1 TABLET EVERY DAY  
  
 CALCIUM 600 + D(3) 600 mg(1,500mg) -200 unit Tab Generic drug:  calcium-vitamin D  
1 tablet with food  
  
 hydroCHLOROthiazide 25 mg tablet Commonly known as:  HYDRODIURIL Take 1 Tab by mouth daily. lisinopril 5 mg tablet Commonly known as:  PRINIVIL, ZESTRIL  
TAKE 1 TABLET EVERY DAY  
  
 meloxicam 15 mg tablet Commonly known as:  MOBIC  
TAKE 1 TABLET EVERY DAY  
  
 metFORMIN  mg tablet Commonly known as:  GLUCOPHAGE XR  
TAKE 1 TABLET TWICE DAILY Patient Instructions   
-------------------------------------------------------------------------------------------- Refills    -    please call your pharmacy and have them send us a refill request 
 
Results  -  allow up to a week for lab results to be processed and reviewed. Phone calls  -  Allow upto 24 hrs. for non-urgent calls to be retained Prior authorization - It may take up to 2 weeks to process, depending on your insurance Forms  -  FMLA, DMV, patient assistance, etc. will take up to 2 weeks to process Cancellations - please notify the office in advance if you cannot keep your appointment Samples  - will only be dispensed at visits as supply is limited If you are having a medical emergency call 911 
 
-------------------------------------------------------------------------------------------- STAy  ON HCTZ 25 MG A DAY Introducing [facility]! Liat Preston 85 introduces ThinkSmart patient portal. Now you can access parts of your medical record, email your doctor's office, and request medication refills online. 1. In your internet browser, go to [email or web address] 2. Click on the First Time User? Click Here link in the Sign In box. You will see the New Member Sign Up page. 3. Enter your ThinkSmart Access Code exactly as it appears below.  You will not need to use this code after youve completed the sign-up process. If you do not sign up before the expiration date, you must request a new code. · eIQnetworks Access Code: [email or web address] Expires: 1/9/2019 10:17 AM 
 
4. Enter the last four digits of your Social Security Number (xxxx) and Date of Birth (mm/dd/yyyy) as indicated and click Submit. You will be taken to the next sign-up page. 5. Create a eIQnetworks ID. This will be your eIQnetworks login ID and cannot be changed, so think of one that is secure and easy to remember. 6. Create a eIQnetworks password. You can change your password at any time. 7. Enter your Password Reset Question and Answer. This can be used at a later time if you forget your password. 8. Enter your e-mail address. You will receive e-mail notification when new information is available in [address]. 9. Click Sign Up. You can now view and download portions of your medical record. 10. Click the Download Summary menu link to download a portable copy of your medical information. If you have questions, please visit the Frequently Asked Questions section of the eIQnetworks website. Remember, eIQnetworks is NOT to be used for urgent needs. For medical emergencies, dial 911. Now available from your iPhone and Android! Please provide this summary of care documentation to your next provider. Your primary care clinician is listed as Anthony Denise. If you have any questions after today's visit, please call [phone].

## 2018-10-11 NOTE — TELEPHONE ENCOUNTER
[facility] pt still has steps to complete in order for our office to receive prolia. Pt must complete New Patient Review Questions.  Will inform pt

## 2018-10-11 NOTE — PATIENT INSTRUCTIONS
--------------------------------------------------------------------------------------------    Refills    -    please call your pharmacy and have them send us a refill request    Results  -  allow up to a week for lab results to be processed and reviewed. Phone calls  -  Allow upto 24 hrs.  for non-urgent calls to be retained    Prior authorization - It may take up to 2 weeks to process, depending on your insurance    Forms  -  FMLA, DMV, patient assistance, etc. will take up to 2 weeks to process    Cancellations - please notify the office in advance if you cannot keep your appointment    Samples  - will only be dispensed at visits as supply is limited      If you are having a medical emergency call 911    --------------------------------------------------------------------------------------------      STAy  ON HCTZ 25 MG A DAY

## 2018-10-24 ENCOUNTER — TELEPHONE (OUTPATIENT)
Dept: ENDOCRINOLOGY | Age: 68
End: 2018-10-24

## 2018-10-24 NOTE — TELEPHONE ENCOUNTER
Pt has not been able to get in touch with savings programs for prolia. She has been informed that appointment tomorrow will have to be cancelled.  She will call back to schedule when she has co-pay taken care of

## 2018-11-09 ENCOUNTER — OFFICE VISIT (OUTPATIENT)
Dept: ENDOCRINOLOGY | Age: 68
End: 2018-11-09

## 2018-11-09 VITALS
HEIGHT: 63 IN | SYSTOLIC BLOOD PRESSURE: 139 MMHG | DIASTOLIC BLOOD PRESSURE: 70 MMHG | OXYGEN SATURATION: 100 % | TEMPERATURE: 97.7 F | BODY MASS INDEX: 31.54 KG/M2 | WEIGHT: 178 LBS | HEART RATE: 84 BPM | RESPIRATION RATE: 16 BRPM

## 2018-11-09 DIAGNOSIS — M81.0 SENILE OSTEOPOROSIS: Primary | ICD-10-CM

## 2018-11-09 NOTE — PROGRESS NOTES
Prolia administered in left arm. Patient tolerated well.   Patient Supply  Lot# [id number]   Exp: 01/21  [facility]#[id number]

## 2018-11-09 NOTE — PATIENT INSTRUCTIONS
Please call office if there is any injection site reactions like redness or swelling     Call 911 or go to Emergency room if you are feeling dizzy and developing shortness of breath

## 2018-11-11 ENCOUNTER — DOCUMENTATION ONLY (OUTPATIENT)
Dept: ENDOCRINOLOGY | Age: 68
End: 2018-11-11

## 2018-11-11 NOTE — PROGRESS NOTES
Please schedule for 2 site biopsy time     She has infact 3 nodules, 2 on right and one on left -- I will decide on table which two need to be biopsied       Edyta Dubose MD

## 2018-12-19 ENCOUNTER — OFFICE VISIT (OUTPATIENT)
Dept: ENDOCRINOLOGY | Age: 68
End: 2018-12-19

## 2018-12-19 ENCOUNTER — HOSPITAL ENCOUNTER (OUTPATIENT)
Dept: LAB | Age: 68
Discharge: HOME | End: 2018-12-19

## 2018-12-19 VITALS
HEIGHT: 63 IN | WEIGHT: 176.7 LBS | SYSTOLIC BLOOD PRESSURE: 134 MMHG | HEART RATE: 75 BPM | BODY MASS INDEX: 31.31 KG/M2 | OXYGEN SATURATION: 99 % | RESPIRATION RATE: 18 BRPM | DIASTOLIC BLOOD PRESSURE: 70 MMHG | TEMPERATURE: 98 F

## 2018-12-19 DIAGNOSIS — E04.2 MULTIPLE THYROID NODULES: ICD-10-CM

## 2018-12-19 DIAGNOSIS — E04.2 MULTINODULAR GOITER: Primary | ICD-10-CM

## 2018-12-19 NOTE — PROGRESS NOTES
1. Have you been to the ER, urgent care clinic since your last visit? No  Hospitalized since your last visit? No    2. Have you seen or consulted any other health care providers outside of the [address] [place] [address] since your last visit? Include any pap smears or colon screening.  No     Wt Readings from Last 3 Encounters:   12/19/18 176 lb 11.2 oz (80.2 kg)   11/09/18 178 lb (80.7 kg)   10/11/18 177 lb 4.8 oz (80.4 kg)     Temp Readings from Last 3 Encounters:   12/19/18 98 °F (36.7 °C) (Oral)   11/09/18 97.7 °F (36.5 °C) (Oral)   10/11/18 97.3 °F (36.3 °C) (Oral)     BP Readings from Last 3 Encounters:   12/19/18 134/70   11/09/18 139/70   10/11/18 130/76     Pulse Readings from Last 3 Encounters:   12/19/18 75   11/09/18 84   10/11/18 76

## 2018-12-19 NOTE — PROGRESS NOTES
[facility] DIABETES & ENDOCRINOLOGY  OFFICE PROCEDURE PROGRESS NOTE        Chart reviewed for the following:   Chaparro Fairchild MD, have reviewed the History, Physical and updated the Allergic reactions for [address] performed immediately prior to start of procedure:   Chaparro Fairchild MD, have performed the following reviews on [address] [place] [address] prior to the start of the procedure:            * Patient was identified by name and date of birth   * Agreement on procedure being performed was verified  * Risks and Benefits explained to the patient  * Procedure site verified and marked as necessary  * Patient was positioned for comfort  * Consent was signed and verified     Time: 3 00 pm    Pain scale : 0    Date of procedure: 12/19/2018    Procedure performed by:  Noelle Cheney MD    Provider assisted by:   Dennys Corcoran    Real time imaging was performed in both transverse and sagittal planes    Nodule right  # 1    Following informed consent, a procedural pause was held to confirm patiient identity and site of biopsy. After sterile preparation, FNA guidance was performed using direct ultrasound guidance to confirm accurate needle placement. 4 aspirations were made using 25 G needles  Samples were submitted to cytology  Patient  tolerated procedure well without complications  After care instructions provided. Pain scale post procedure : 0    Real time imaging was performed in both transverse and sagittal planes    Nodule right  # 2    Following informed consent, a procedural pause was held to confirm patiient identity and site of biopsy. After sterile preparation, FNA guidance was performed using direct ultrasound guidance to confirm accurate needle placement. 4 aspirations were made using 25 G needles  Samples were submitted to cytology  Patient  tolerated procedure well without complications  After care instructions provided.       Pain scale post procedure : 0

## 2018-12-19 NOTE — PATIENT INSTRUCTIONS
--------------------------------------------------------------------------------------------    Refills    -    please call your pharmacy and have them send us a refill request    Results  -  allow up to a week for lab results to be processed and reviewed. Phone calls  -  Allow upto 24 hrs.  for non-urgent calls to be retained    Prior authorization - It may take up to 4 weeks to process, depending on your insurance    Forms  -  FMLA, DMV, patient assistance, etc. will take up to 2 weeks to process    Cancellations - please notify the office in advance if you cannot keep your appointment    Samples  - will only be dispensed at visits as supply is limited      If you are having a medical emergency call 911    --------------------------------------------------------------------------------------------  Please take tylenol 500 mg or Motrin 400 mg (2 pills of 200 mg dose) OTC,  if there is any biopsy site pain    You can go back to your normal routine immediately    If you notice any dime sized redness, at the biopsy site, it is normal and do not worry     If you have more symptoms and signs requiring emergency assistance,  call 911   or go to Emergency room

## 2018-12-20 PROCEDURE — 88173 CYTOPATH EVAL FNA REPORT: CPT

## 2018-12-25 NOTE — PROGRESS NOTES
Patient  has 3 nodules in the thyroid gland    2 on right side and one on left side   Deciding to do 2 nodules on the Jonnathan Zepeda MD

## 2019-01-14 DIAGNOSIS — R82.994 HYPERCALCIURIA: ICD-10-CM

## 2019-01-14 DIAGNOSIS — M81.0 SENILE OSTEOPOROSIS: ICD-10-CM

## 2019-01-14 RX ORDER — HYDROCHLOROTHIAZIDE 25 MG/1
25 TABLET ORAL DAILY
Qty: 90 TAB | Refills: 4 | Status: SHIPPED | OUTPATIENT
Start: 2019-01-14 | End: 2019-05-13 | Stop reason: DRUGHIGH

## 2019-04-23 ENCOUNTER — DOCUMENTATION ONLY (OUTPATIENT)
Dept: ENDOCRINOLOGY | Age: 69
End: 2019-04-23

## 2019-04-23 NOTE — PROGRESS NOTES
Spoke to [employer]. [facility] states that patient must call in to [phone] to authorize payment and shipping. Once patient calls, the specialty pharmacy will ship Prolia to office. Writer informed patient. Patient verbalized understanding.

## 2019-04-25 ENCOUNTER — DOCUMENTATION ONLY (OUTPATIENT)
Dept: ENDOCRINOLOGY | Age: 69
End: 2019-04-25

## 2019-04-25 NOTE — PROGRESS NOTES
Spoke to patient. Patient states Prolia injection was authorized. Patient states Prolia injection should arrive at the office on May 2, 2019.

## 2019-05-08 ENCOUNTER — OFFICE VISIT (OUTPATIENT)
Dept: ENDOCRINOLOGY | Age: 69
End: 2019-05-08

## 2019-05-08 VITALS
DIASTOLIC BLOOD PRESSURE: 66 MMHG | HEART RATE: 85 BPM | OXYGEN SATURATION: 97 % | WEIGHT: 179.4 LBS | TEMPERATURE: 97.6 F | HEIGHT: 63 IN | BODY MASS INDEX: 31.79 KG/M2 | SYSTOLIC BLOOD PRESSURE: 123 MMHG | RESPIRATION RATE: 18 BRPM

## 2019-05-08 DIAGNOSIS — R82.994 HYPERCALCIURIA: ICD-10-CM

## 2019-05-08 DIAGNOSIS — E55.9 VITAMIN D DEFICIENCY: ICD-10-CM

## 2019-05-08 DIAGNOSIS — M81.0 SENILE OSTEOPOROSIS: Primary | ICD-10-CM

## 2019-05-08 DIAGNOSIS — E04.2 MULTINODULAR GOITER: ICD-10-CM

## 2019-05-08 NOTE — PROGRESS NOTES
1. Have you been to the ER, urgent care clinic since your last visit? No  Hospitalized since your last visit? No 
 
2. Have you seen or consulted any other health care providers outside of the [address] [place] [address] since your last visit? Include any pap smears or colon screening. No 
 
Wt Readings from Last 3 Encounters:  
05/08/19 179 lb 6.4 oz (81.4 kg) 12/19/18 176 lb 11.2 oz (80.2 kg) 11/09/18 178 lb (80.7 kg) Temp Readings from Last 3 Encounters:  
05/08/19 97.6 °F (36.4 °C) (Oral) 12/19/18 98 °F (36.7 °C) (Oral) 11/09/18 97.7 °F (36.5 °C) (Oral) BP Readings from Last 3 Encounters:  
05/08/19 123/66  
12/19/18 134/70  
11/09/18 139/70 Pulse Readings from Last 3 Encounters:  
05/08/19 85  
12/19/18 75  
11/09/18 84

## 2019-05-08 NOTE — PATIENT INSTRUCTIONS
-------------------------------------------------------------------------------------------- Refills    -    please call your pharmacy and have them send us a refill request 
 
Results  -  allow up to a week for lab results to be processed and reviewed. Phone calls  -  Allow upto 24 hrs. for non-urgent calls to be retained Prior authorization - It may take up to 4 weeks to process, depending on your insurance Forms  -  FMLA, DMV, patient assistance, etc. will take up to 2 weeks to process Cancellations - please notify the office in advance if you cannot keep your appointment Samples  - will only be dispensed at visits as supply is limited If you are having a medical emergency call 911 
 
---------------------------------------------------------- Take calcium and vit D every day Please call office if there is any injection site reactions like redness or swelling Call 911 or go to Emergency room if you are feeling dizzy and developing shortness of breath

## 2019-05-08 NOTE — PROGRESS NOTES
HISTORY OF PRESENT ILLNESS Debbie Morgan is a 68 y.o. female. HPI 
 f/u after  Last  visit for osteoporosis management    And multinodular  goiter  From oct   2018 Getting  Prolia shot today Had usg and FNA on right 2 nodules in office in nov 2018 Old history :  
Referred by pcp Osteoporosis Patient complains of osteoporosis. She was diagnosed with osteoporosis by bone density scan in nov 2017 . Patient denies history of fracture. The cause of osteoporosis is felt to be due to postmenopausal estrogen deficiency. She is currently being treated with calcium and vitamin D supplementation. She is currently being treated with bisphosphonates Osteoporosis Risk Factors Nonmodifiable Personal Hx of fracture as an adult: no 
Hx of fracture in first-degree relative: no 
 race: no 
Advanced age: yes Female sex: yes Dementia: no 
Poor health/frailty: no 
  
Potentially modifiable: 
Tobacco use: no Low body weight (<127 lbs): no 
Estrogen deficiency 
   early menopause (age <45) or bilateral ovariectomy: no 
   prolonged premenopausal amenorrhea (>1 yr): no 
Low calcium intake (lifelong): no 
Alcoholism: no 
Recurrent falls: no Inadequate physical activity: no 
 
 
 
 
Past Medical History:  
Diagnosis Date  High cholesterol  Hypertension  Osteoporosis Social History Socioeconomic History  Marital status:  Spouse name: Not on file  Number of children: Not on file  Years of education: Not on file  Highest education level: Not on file Occupational History  Not on file Social Needs  Financial resource strain: Not on file  Food insecurity:  
  Worry: Not on file Inability: Not on file  Transportation needs:  
  Medical: Not on file Non-medical: Not on file Tobacco Use  Smoking status: Light Tobacco Smoker  Smokeless tobacco: Never Used Substance and Sexual Activity  Alcohol use: Yes  Drug use:  No  
  Sexual activity: Not on file Lifestyle  Physical activity:  
  Days per week: Not on file Minutes per session: Not on file  Stress: Not on file Relationships  Social connections:  
  Talks on phone: Not on file Gets together: Not on file Attends Denominational service: Not on file Active member of club or organization: Not on file Attends meetings of clubs or organizations: Not on file Relationship status: Not on file  Intimate partner violence:  
  Fear of current or ex partner: Not on file Emotionally abused: Not on file Physically abused: Not on file Forced sexual activity: Not on file Other Topics Concern  Not on file Social History Narrative  Not on file History reviewed. No pertinent family history. Review of Systems Constitutional: Negative. HENT: Negative. Eyes: Negative for pain and redness. Respiratory: Negative. Cardiovascular: Negative for chest pain, palpitations and leg swelling. Gastrointestinal: Negative. Negative for constipation. Genitourinary: Negative. Musculoskeletal: Positive for back pain, joint pain and myalgias. Skin: Negative. Neurological: Negative. Endo/Heme/Allergies: Negative. Psychiatric/Behavioral: Negative for depression and memory loss. The patient does not have insomnia. Physical Exam  
Constitutional: She is oriented to person, place, and time. She appears well-developed and well-nourished. HENT:  
Head: Normocephalic. Eyes: Conjunctivae and EOM are normal. Pupils are equal, round, and reactive to light. Neck: Normal range of motion. Neck supple. No JVD present. No tracheal deviation present. thyromegaly present. Cardiovascular: Normal rate, regular rhythm and normal heart sounds. No murmur heard. Pulmonary/Chest: Breath sounds normal.  
Abdominal: Soft. Bowel sounds are normal.  
Musculoskeletal: Normal range of motion. Lymphadenopathy: She has no cervical adenopathy. Neurological: She is alert and oriented to person, place, and time. She has normal reflexes. Skin: Skin is warm. Psychiatric: She has a normal mood and affect. Reviewed labs Has 24 hr urine  Calcium - over 400 mg  
 
ASSESSMENT and PLAN 
 
1. Osteoporosis :  
Due for dexa  Nov 2019 Osteoporosis :  Date : nov 2017  Bone DEXA  ap spine T-score -2.9; left femoral Neck T- score  -2.4, right femoral neck T-score -2.5 Date : jan 19 2012  Bone DEXA  ap spine T-score -1.5 ( L2 is -2.5); left femoral Neck T- score  -2.2, right femoral neck T-score-2.3 AGE, tamoxifen AND HYPERCALCIURIA She finished  primary screening for secondary causes of osteopOrosis (thyroid, parathyroid,  CAME POSITIVE FOR hypercalciuria, vitamin D def, multiple myeloma, celiac sprue). Discussed  starting on Prolia  twice yearly once insurance approval is done. Medication benefits, side-effects and adverse effects discussed in detail with patient and family. Patient is agreeable and started her on First shot today April 2018 She is getting  third shot today Fall precautions discussed with the patient Patient will continue on high dose supplementation of vitamin D and calcium at least 1200mg a day Continue with calcium (1000mg - 1200mg) and vit D supplements(800iu- 1200iu) daily. 2. H/o Breast cancer, left side  diagnosed in   2009 Had radiation treatments and lumpectomy Tamoxifen ended 2014  
 
 
3. HYPERCALCIURIA  : MORE THAN 400 IN 25 HOURS  In June 2018 STARTed ON HCTZ 25 MG A DAY  In June 2018 On  repeat 24 hr calcium after 2 months on hctz, great success with urine calcium to 214  From over 450  
 
 
 
4. Thyromegaly - usg  Showed presence of 2 thyroid nodules on right side, negative for cancer from Nov 2018 5. DM 2 - managed by pcp  
 
 
> 50 % of time is spent on counseling Patient voiced understanding her plan of care

## 2019-05-08 NOTE — PROGRESS NOTES
Prolia injection given in left arm Patient supply Prolia 60 mg [employer] Lot [id number] Exp 06/21 . [facility] 47 [phone]

## 2019-05-08 NOTE — LETTER
5/8/19 Patient: Shun Che YOB: 1950 Date of Visit: 5/8/2019 Mima Brown MD 
[address] [facility] [address] [facility] [address] VIA Facsimile: [phone] Dear Mima Brown MD, Thank you for referring Ms. Chuckie Allison to [facility] DIABETES & ENDOCRINOLOGY for evaluation. My notes for this consultation are attached. If you have questions, please do not hesitate to call me. I look forward to following your patient along with you. Sincerely, Moe Christensen MD

## 2019-05-09 LAB
25(OH)D3+25(OH)D2 SERPL-MCNC: 31.6 NG/ML (ref 30–100)
ALBUMIN SERPL-MCNC: 4.2 G/DL (ref 3.5–4.8)
ALBUMIN/GLOB SERPL: 1.4 {RATIO} (ref 1.2–2.2)
ALP SERPL-CCNC: 52 IU/L (ref 39–117)
ALT SERPL-CCNC: 21 IU/L (ref 0–32)
AST SERPL-CCNC: 23 IU/L (ref 0–40)
BASOPHILS # BLD AUTO: 0 X10E3/UL (ref 0–0.2)
BASOPHILS NFR BLD AUTO: 1 %
BILIRUB SERPL-MCNC: 0.3 MG/DL (ref 0–1.2)
BUN SERPL-MCNC: 11 MG/DL (ref 8–27)
BUN/CREAT SERPL: 21 (ref 12–28)
CALCIUM SERPL-MCNC: 9.8 MG/DL (ref 8.7–10.3)
CHLORIDE SERPL-SCNC: 99 MMOL/L (ref 96–106)
CO2 SERPL-SCNC: 28 MMOL/L (ref 20–29)
CREAT SERPL-MCNC: 0.52 MG/DL (ref 0.57–1)
EOSINOPHIL # BLD AUTO: 0.1 X10E3/UL (ref 0–0.4)
EOSINOPHIL NFR BLD AUTO: 2 %
ERYTHROCYTE [DISTWIDTH] IN BLOOD BY AUTOMATED COUNT: 14.4 % (ref 12.3–15.4)
GLOBULIN SER CALC-MCNC: 3 G/DL (ref 1.5–4.5)
GLUCOSE SERPL-MCNC: 178 MG/DL (ref 65–99)
HCT VFR BLD AUTO: 35.8 % (ref 34–46.6)
HGB BLD-MCNC: 12.3 G/DL (ref 11.1–15.9)
IMM GRANULOCYTES # BLD AUTO: 0 X10E3/UL (ref 0–0.1)
IMM GRANULOCYTES NFR BLD AUTO: 0 %
LYMPHOCYTES # BLD AUTO: 2.1 X10E3/UL (ref 0.7–3.1)
LYMPHOCYTES NFR BLD AUTO: 40 %
MCH RBC QN AUTO: 30 PG (ref 26.6–33)
MCHC RBC AUTO-ENTMCNC: 34.4 G/DL (ref 31.5–35.7)
MCV RBC AUTO: 87 FL (ref 79–97)
MONOCYTES # BLD AUTO: 0.2 X10E3/UL (ref 0.1–0.9)
MONOCYTES NFR BLD AUTO: 4 %
NEUTROPHILS # BLD AUTO: 2.7 X10E3/UL (ref 1.4–7)
NEUTROPHILS NFR BLD AUTO: 53 %
PLATELET # BLD AUTO: 337 X10E3/UL (ref 150–379)
POTASSIUM SERPL-SCNC: 3.2 MMOL/L (ref 3.5–5.2)
PROT SERPL-MCNC: 7.2 G/DL (ref 6–8.5)
RBC # BLD AUTO: 4.1 X10E6/UL (ref 3.77–5.28)
SODIUM SERPL-SCNC: 141 MMOL/L (ref 134–144)
T4 FREE SERPL-MCNC: 0.81 NG/DL (ref 0.82–1.77)
TSH SERPL DL<=0.005 MIU/L-ACNC: 0.75 UIU/ML (ref 0.45–4.5)
WBC # BLD AUTO: 5.1 X10E3/UL (ref 3.4–10.8)

## 2019-05-13 RX ORDER — HYDROCHLOROTHIAZIDE 12.5 MG/1
12.5 TABLET ORAL DAILY
Qty: 30 TAB | Refills: 6 | Status: SHIPPED | OUTPATIENT
Start: 2019-05-13 | End: 2019-11-13 | Stop reason: SDUPTHER

## 2019-05-13 NOTE — TELEPHONE ENCOUNTER
Verified  and informed patient of potassium results pt voiced understanding. will send dose change to pharmacy.   [facility] ([facility] from 25[facility] to 12.5mg) pt aware per dr Sujatha Paez

## 2019-05-13 NOTE — PROGRESS NOTES
Verified  Informed pt of potassium level and dose change of HCTX from 25 to 12.5-pt voiced understanding

## 2019-05-13 NOTE — PROGRESS NOTES
Likely having on and off low potassium from fluid pill HCTZ 25 mg a day Cut it down to half a pill a day please

## 2019-11-06 ENCOUNTER — APPOINTMENT (OUTPATIENT)
Dept: ENDOCRINOLOGY | Age: 69
End: 2019-11-06

## 2019-11-06 DIAGNOSIS — R82.994 HYPERCALCIURIA: ICD-10-CM

## 2019-11-06 DIAGNOSIS — E04.2 MULTINODULAR GOITER: ICD-10-CM

## 2019-11-06 DIAGNOSIS — E55.9 VITAMIN D DEFICIENCY: ICD-10-CM

## 2019-11-06 DIAGNOSIS — M81.0 SENILE OSTEOPOROSIS: ICD-10-CM

## 2019-11-07 LAB
25(OH)D3+25(OH)D2 SERPL-MCNC: 37.5 NG/ML (ref 30–100)
ALBUMIN SERPL-MCNC: 4.1 G/DL (ref 3.5–4.8)
ALBUMIN/GLOB SERPL: 1.1 {RATIO} (ref 1.2–2.2)
ALP SERPL-CCNC: 51 IU/L (ref 39–117)
ALT SERPL-CCNC: 22 IU/L (ref 0–32)
AST SERPL-CCNC: 19 IU/L (ref 0–40)
BILIRUB SERPL-MCNC: 0.4 MG/DL (ref 0–1.2)
BUN SERPL-MCNC: 9 MG/DL (ref 8–27)
BUN/CREAT SERPL: 18 (ref 12–28)
CALCIUM SERPL-MCNC: 9.2 MG/DL (ref 8.7–10.3)
CHLORIDE SERPL-SCNC: 99 MMOL/L (ref 96–106)
CO2 SERPL-SCNC: 23 MMOL/L (ref 20–29)
CREAT SERPL-MCNC: 0.49 MG/DL (ref 0.57–1)
GLOBULIN SER CALC-MCNC: 3.7 G/DL (ref 1.5–4.5)
GLUCOSE SERPL-MCNC: 130 MG/DL (ref 65–99)
POTASSIUM SERPL-SCNC: 3.2 MMOL/L (ref 3.5–5.2)
PROT SERPL-MCNC: 7.8 G/DL (ref 6–8.5)
PTH-INTACT SERPL-MCNC: 27 PG/ML (ref 15–65)
SODIUM SERPL-SCNC: 139 MMOL/L (ref 134–144)
TSH SERPL DL<=0.005 MIU/L-ACNC: 1.34 UIU/ML (ref 0.45–4.5)

## 2019-11-13 ENCOUNTER — OFFICE VISIT (OUTPATIENT)
Dept: ENDOCRINOLOGY | Age: 69
End: 2019-11-13

## 2019-11-13 VITALS
DIASTOLIC BLOOD PRESSURE: 67 MMHG | HEIGHT: 63 IN | WEIGHT: 170.8 LBS | TEMPERATURE: 97.5 F | RESPIRATION RATE: 18 BRPM | OXYGEN SATURATION: 99 % | SYSTOLIC BLOOD PRESSURE: 129 MMHG | HEART RATE: 81 BPM | BODY MASS INDEX: 30.26 KG/M2

## 2019-11-13 DIAGNOSIS — E04.2 MULTINODULAR GOITER: ICD-10-CM

## 2019-11-13 DIAGNOSIS — M81.0 SENILE OSTEOPOROSIS: Primary | ICD-10-CM

## 2019-11-13 DIAGNOSIS — E55.9 VITAMIN D DEFICIENCY: ICD-10-CM

## 2019-11-13 DIAGNOSIS — R82.994 HYPERCALCIURIA: ICD-10-CM

## 2019-11-13 DIAGNOSIS — E83.59 HYPOCALCIURIA: ICD-10-CM

## 2019-11-13 RX ORDER — HYDROCHLOROTHIAZIDE 12.5 MG/1
12.5 TABLET ORAL DAILY
Qty: 90 TAB | Refills: 4 | Status: SHIPPED | OUTPATIENT
Start: 2019-11-13 | End: 2020-06-17 | Stop reason: DRUGHIGH

## 2019-11-13 RX ORDER — POTASSIUM CHLORIDE 750 MG/1
10 TABLET, EXTENDED RELEASE ORAL 2 TIMES DAILY
Qty: 6 TAB | Refills: 0 | Status: SHIPPED | OUTPATIENT
Start: 2019-11-13 | End: 2019-11-16

## 2019-11-13 NOTE — LETTER
11/14/19 Patient: Roro Gutierrez YOB: 1950 Date of Visit: 11/13/2019 Nancy Castellano MD 
[address] [facility] [address] [facility] [place] [address] VIA Facsimile: [phone] Dear Nancy Castellano MD, Thank you for referring Ms. Lisa Nagel to [facility] DIABETES & ENDOCRINOLOGY for evaluation. My notes for this consultation are attached. If you have questions, please do not hesitate to call me. I look forward to following your patient along with you. Sincerely, Saknet Gerber MD

## 2019-11-13 NOTE — PROGRESS NOTES
1. Have you been to the ER, urgent care clinic since your last visit? [facility]Sinus Infection/October   Hospitalized since your last visit? No    2. Have you seen or consulted any other health care providers outside of the [address] [place] [address] since your last visit? Include any pap smears or colon screening.  No       Wt Readings from Last 3 Encounters:   11/13/19 170 lb 12.8 oz (77.5 kg)   05/08/19 179 lb 6.4 oz (81.4 kg)   12/19/18 176 lb 11.2 oz (80.2 kg)     Temp Readings from Last 3 Encounters:   11/13/19 97.5 °F (36.4 °C) (Oral)   05/08/19 97.6 °F (36.4 °C) (Oral)   12/19/18 98 °F (36.7 °C) (Oral)     BP Readings from Last 3 Encounters:   11/13/19 129/67   05/08/19 123/66   12/19/18 134/70     Pulse Readings from Last 3 Encounters:   11/13/19 81   05/08/19 85   12/19/18 75

## 2019-11-13 NOTE — PATIENT INSTRUCTIONS
-------------------------------------------------------------------------------------------- Refills    -    please call your pharmacy and have them send us a refill request 
 
Results  -  allow up to a week for lab results to be processed and reviewed. Phone calls  -  Allow upto 24 hrs. for non-urgent calls to be retained Prior authorization - It may take up to 4 weeks to process, depending on your insurance Forms  -  FMLA, DMV, patient assistance, etc. will take up to 2 weeks to process Cancellations - please notify the office in advance if you cannot keep your appointment Samples  - will only be dispensed at visits as supply is limited If you are having a medical emergency call 911 
 
---------------------------------------------------------- Take calcium and vit D every day START A BANANA DAILY  
 
STAY ON HCTZ 12.5 MG A DAY  
 
POTASSIUM CHLORIDE  10 MEDQ TWICE  A DAY  FOR 3  DAYS AS NEEDED 24 hour urine collection instructions for calcium On the day you plan to collect urine 1. Discard first urine sample soon after you get up 2. Start collecting urine samples in the container then on whole first day . Adilson Avalos ... 3. until the first sample next day is collected into the jar.

## 2019-11-18 NOTE — TELEPHONE ENCOUNTER
----- Message from Quoc Baxter sent at 11/18/2019 12:24 PM EST -----  Regarding: DR Barbara Layton / Claude Eric Message/Vendor Calls    Mayra from [facility] is requesting to speak with nurse in regard to a shipping \"PROLIA\" 60 MG to office.     Callback required     Best contact number(s): [phone]        Quoc Baxter

## 2019-11-20 ENCOUNTER — CLINICAL SUPPORT (OUTPATIENT)
Dept: ENDOCRINOLOGY | Age: 69
End: 2019-11-20

## 2019-11-20 VITALS
HEIGHT: 63 IN | RESPIRATION RATE: 14 BRPM | DIASTOLIC BLOOD PRESSURE: 74 MMHG | SYSTOLIC BLOOD PRESSURE: 122 MMHG | WEIGHT: 171 LBS | BODY MASS INDEX: 30.3 KG/M2 | TEMPERATURE: 97.8 F | HEART RATE: 82 BPM

## 2019-11-20 DIAGNOSIS — M81.0 SENILE OSTEOPOROSIS: Primary | ICD-10-CM

## 2019-11-20 NOTE — TELEPHONE ENCOUNTER
Please advise on dose and directions as before it stated   Take 1 Tab by mouth two (2) times a day for 3 days.  As needed    [facility] [address]

## 2019-11-21 RX ORDER — POTASSIUM CHLORIDE 750 MG/1
10 TABLET, EXTENDED RELEASE ORAL 2 TIMES DAILY
Qty: 6 TAB | Refills: 0 | Status: SHIPPED | OUTPATIENT
Start: 2019-11-21 | End: 2019-11-24

## 2019-12-02 ENCOUNTER — HOSPITAL ENCOUNTER (OUTPATIENT)
Dept: BONE DENSITY | Age: 69
Discharge: HOME | End: 2019-12-02
Attending: INTERNAL MEDICINE
Payer: COMMERCIAL

## 2019-12-02 DIAGNOSIS — E55.9 VITAMIN D DEFICIENCY: ICD-10-CM

## 2019-12-02 DIAGNOSIS — E04.2 MULTINODULAR GOITER: ICD-10-CM

## 2019-12-02 DIAGNOSIS — R82.994 HYPERCALCIURIA: ICD-10-CM

## 2019-12-02 DIAGNOSIS — M81.0 SENILE OSTEOPOROSIS: ICD-10-CM

## 2019-12-02 PROCEDURE — 77080 DXA BONE DENSITY AXIAL: CPT

## 2019-12-02 NOTE — PROGRESS NOTES
Two pt identifiers confirmed. Inform pt that bone quality improved significantly  On DEXA   Pt verbalized understanding of information discussed w/ no further questions at this time.

## 2020-04-15 ENCOUNTER — TELEPHONE (OUTPATIENT)
Dept: ENDOCRINOLOGY | Age: 70
End: 2020-04-15

## 2020-04-15 DIAGNOSIS — E55.9 VITAMIN D DEFICIENCY: ICD-10-CM

## 2020-04-15 DIAGNOSIS — E01.0 THYROMEGALY: ICD-10-CM

## 2020-04-15 DIAGNOSIS — M81.0 SENILE OSTEOPOROSIS: ICD-10-CM

## 2020-04-15 DIAGNOSIS — E04.2 MULTINODULAR GOITER: Primary | ICD-10-CM

## 2020-04-15 DIAGNOSIS — E04.9 THYROID GOITER: ICD-10-CM

## 2020-04-15 DIAGNOSIS — R82.994 HYPERCALCIURIA: ICD-10-CM

## 2020-04-15 NOTE — TELEPHONE ENCOUNTER
Order placed for pt per verbal order with read back from Dr. Sowmya Fermin 04/15/20    Lab slips mailed

## 2020-05-18 ENCOUNTER — TELEPHONE (OUTPATIENT)
Dept: ENDOCRINOLOGY | Age: 70
End: 2020-05-18

## 2020-05-18 NOTE — TELEPHONE ENCOUNTER
----- Message from Padma Salmon sent at 5/18/2020 10:45 AM EDT -----  Regarding: Dr. Mary Lewis calling to schedule delivery for pt's Eugenia.  Contact is 1 [phone]

## 2020-05-19 ENCOUNTER — TELEPHONE (OUTPATIENT)
Dept: ENDOCRINOLOGY | Age: 70
End: 2020-05-19

## 2020-05-19 DIAGNOSIS — E04.2 MULTINODULAR GOITER: ICD-10-CM

## 2020-05-19 DIAGNOSIS — M81.0 SENILE OSTEOPOROSIS: Primary | ICD-10-CM

## 2020-05-19 DIAGNOSIS — E55.9 VITAMIN D DEFICIENCY: ICD-10-CM

## 2020-05-19 NOTE — TELEPHONE ENCOUNTER
----- Message from Austin Tavares sent at 5/19/2020  4:24 PM EDT -----  Regarding: Dr. Jeannine Pierce Message/Vendor Calls    Caller's first and last name:  [facility]     Reason for call:  Requesting to speak with the office    Callback required yes/no and why:  yes    Best contact number(s):  [phone]     Details to clarify the request:  Requesting to scheduled delivery of medication \"Prolia\".      Austin Tavares

## 2020-05-20 LAB
25(OH)D3+25(OH)D2 SERPL-MCNC: 43.7 NG/ML (ref 30–100)
ALBUMIN SERPL-MCNC: 4.7 G/DL (ref 3.7–4.7)
ALBUMIN/GLOB SERPL: 1.6 {RATIO} (ref 1.2–2.2)
ALP SERPL-CCNC: 51 IU/L (ref 39–117)
ALT SERPL-CCNC: 25 IU/L (ref 0–32)
AST SERPL-CCNC: 20 IU/L (ref 0–40)
BILIRUB SERPL-MCNC: 0.3 MG/DL (ref 0–1.2)
BUN SERPL-MCNC: 9 MG/DL (ref 8–27)
BUN/CREAT SERPL: 14 (ref 12–28)
CALCIUM SERPL-MCNC: 9.5 MG/DL (ref 8.7–10.3)
CHLORIDE SERPL-SCNC: 104 MMOL/L (ref 96–106)
CO2 SERPL-SCNC: 27 MMOL/L (ref 20–29)
CREAT SERPL-MCNC: 0.64 MG/DL (ref 0.57–1)
GLOBULIN SER CALC-MCNC: 2.9 G/DL (ref 1.5–4.5)
GLUCOSE SERPL-MCNC: 122 MG/DL (ref 65–99)
POTASSIUM SERPL-SCNC: 3.8 MMOL/L (ref 3.5–5.2)
PROT SERPL-MCNC: 7.6 G/DL (ref 6–8.5)
SODIUM SERPL-SCNC: 145 MMOL/L (ref 134–144)
TSH SERPL DL<=0.005 MIU/L-ACNC: 0.92 UIU/ML (ref 0.45–4.5)

## 2020-06-17 ENCOUNTER — OFFICE VISIT (OUTPATIENT)
Dept: ENDOCRINOLOGY | Age: 70
End: 2020-06-17

## 2020-06-17 VITALS
TEMPERATURE: 97.5 F | DIASTOLIC BLOOD PRESSURE: 76 MMHG | SYSTOLIC BLOOD PRESSURE: 122 MMHG | HEIGHT: 63 IN | HEART RATE: 78 BPM | OXYGEN SATURATION: 97 % | BODY MASS INDEX: 31.57 KG/M2 | RESPIRATION RATE: 18 BRPM | WEIGHT: 178.2 LBS

## 2020-06-17 DIAGNOSIS — E04.2 MULTINODULAR GOITER: ICD-10-CM

## 2020-06-17 DIAGNOSIS — R82.994 HYPERCALCIURIA: ICD-10-CM

## 2020-06-17 DIAGNOSIS — M81.0 SENILE OSTEOPOROSIS: Primary | ICD-10-CM

## 2020-06-17 RX ORDER — DENOSUMAB 60 MG/ML
60 INJECTION SUBCUTANEOUS ONCE
Qty: 1 ML | Refills: 0 | Status: SHIPPED | COMMUNITY
Start: 2020-06-17 | End: 2020-06-17

## 2020-06-17 RX ORDER — DENOSUMAB 60 MG/ML
60 INJECTION SUBCUTANEOUS
COMMUNITY
End: 2021-12-05

## 2020-06-17 RX ORDER — HYDROCHLOROTHIAZIDE 25 MG/1
25 TABLET ORAL DAILY
Qty: 90 TAB | Refills: 4 | Status: SHIPPED | OUTPATIENT
Start: 2020-06-17 | End: 2021-02-09 | Stop reason: DRUGHIGH

## 2020-06-17 NOTE — PATIENT INSTRUCTIONS
-------------------------------------------------------------------------------------------- Refills    -    please call your pharmacy and have them send us a refill request 
 
Results  -  allow up to a week for lab results to be processed and reviewed. Phone calls  -  Allow upto 24 hrs. for non-urgent calls to be retained Prior authorization - It may take up to 4 weeks to process, depending on your insurance Forms  -  FMLA, DMV, patient assistance, etc. will take up to 2 weeks to process Cancellations - please notify the office in advance if you cannot keep your appointment Samples  - will only be dispensed at visits as supply is limited If you are having a medical emergency call 911 
 
-------------------------------------------------------------------------------------------- Increase HCTZ to 25 mg a day  ( stop 12.5 mg dose ) Keep kcl 10 meq in hand , use as needed ( 60 pills - 3 refills ) 
 
--------------------------------------------------------------------------------------------- Please call office if there is any injection site reactions like redness or swelling Call 911 or go to Emergency room if you are feeling dizzy and developing shortness of breath

## 2020-06-17 NOTE — PROGRESS NOTES
HISTORY OF PRESENT ILLNESS  Tim Baird is a 65 y.o. female. HPI   f/u after  Last  visit for osteoporosis management  And  multinodular  goiter  From  November 2019   To get Prolia shot today    In the interim,  She had surgery in May 2020 ,  for a large stone on right kidney, which was removed           Osteoporosis  Patient complains of osteoporosis. She was diagnosed with osteoporosis by bone density scan in nov 2017 . Patient denies history of fracture. The cause of osteoporosis is felt to be due to postmenopausal estrogen deficiency. She is currently being treated with calcium and vitamin D supplementation. She is currently being treated with bisphosphonates   Osteoporosis Risk Factors   Nonmodifiable  Personal Hx of fracture as an adult: no  Hx of fracture in first-degree relative: no   race: no  Advanced age: yes  Female sex: yes  Dementia: no  Poor health/frailty: no     Potentially modifiable:  Tobacco use: no  Low body weight (<127 lbs): no  Estrogen deficiency     early menopause (age <45) or bilateral ovariectomy: no     prolonged premenopausal amenorrhea (>1 yr): no  Low calcium intake (lifelong): no  Alcoholism: no  Recurrent falls: no  Inadequate physical activity: no          Past Medical History:   Diagnosis Date    High cholesterol     Hypertension     Osteoporosis        Social History     Socioeconomic History    Marital status:      Spouse name: Not on file    Number of children: Not on file    Years of education: Not on file    Highest education level: Not on file   Occupational History    Not on file   Social Needs    Financial resource strain: Not on file    Food insecurity     Worry: Not on file     Inability: Not on file    Transportation needs     Medical: Not on file     Non-medical: Not on file   Tobacco Use    Smoking status: Light Tobacco Smoker    Smokeless tobacco: Never Used   Substance and Sexual Activity    Alcohol use:  Yes    Drug use: No    Sexual activity: Not on file   Lifestyle    Physical activity     Days per week: Not on file     Minutes per session: Not on file    Stress: Not on file   Relationships    Social connections     Talks on phone: Not on file     Gets together: Not on file     Attends Mor[employer] service: Not on file     Active member of club or organization: Not on file     Attends meetings of clubs or organizations: Not on file     Relationship status: Not on file    Intimate partner violence     Fear of current or ex partner: Not on file     Emotionally abused: Not on file     Physically abused: Not on file     Forced sexual activity: Not on file   Other Topics Concern    Not on file   Social History Narrative    Not on file       History reviewed. No pertinent family history. Review of Systems   Constitutional: Negative. HENT: Negative. Eyes: Negative for pain and redness. Respiratory: Negative. Cardiovascular: Negative for chest pain, palpitations and leg swelling. Gastrointestinal: Negative. Negative for constipation. Genitourinary: Negative. Musculoskeletal: Positive for back pain, joint pain and myalgias. Skin: Negative. Neurological: Negative. Endo/Heme/Allergies: Negative. Psychiatric/Behavioral: Negative for depression and memory loss. The patient does not have insomnia. Physical Exam   Constitutional: She is oriented to person, place, and time. She appears well-developed and well-nourished. HENT:   Head: Normocephalic. Eyes: Conjunctivae and EOM are normal. Pupils are equal, round, and reactive to light. Neck: Normal range of motion. Neck supple. No JVD present. No tracheal deviation present. thyromegaly present. Cardiovascular: Normal rate, regular rhythm and normal heart sounds. No murmur heard. Pulmonary/Chest: Breath sounds normal.   Abdominal: Soft. Bowel sounds are normal.   Musculoskeletal: Normal range of motion.    Lymphadenopathy:     She has no cervical adenopathy. Neurological: She is alert and oriented to person, place, and time. She has normal reflexes. Skin: Skin is warm. Psychiatric: She has a normal mood and affect. Reviewed labs   Has 24 hr urine  Calcium - over 400 mg     ASSESSMENT and PLAN    1. Osteoporosis :   Due for dexa  Nov 2019   Osteoporosis :  Date : nov 2017  Bone DEXA  ap spine T-score -2.9; left femoral Neck T- score  -2.4, right femoral neck T-score -2.5  Date : jan 19 2012  Bone DEXA  ap spine T-score -1.5 ( L2 is -2.5); left femoral Neck T- score  -2.2, right femoral neck T-score-2.3    AGE, tamoxifen AND HYPERCALCIURIA      She finished  primary screening for secondary causes of osteopOrosis (thyroid, parathyroid,  CAME POSITIVE FOR hypercalciuria, vitamin D def, multiple myeloma, celiac sprue). Discussed  starting on Prolia  twice yearly once insurance approval is done. Medication benefits, side-effects and adverse effects discussed in detail with patient and family. Patient is agreeable and started her on First shot today April 2018 ; second in oc 2018 ; And third in April 2019  ; She is due for   Fourth shot  Nov 2019, June 2020 - June 2020     Fall precautions discussed with the patient   Patient will continue on high dose supplementation of vitamin D and calcium at least 1200mg a day      Continue with calcium (1000mg - 1200mg) and vit D supplements(800iu- 1200iu) daily. 2. H/o Breast cancer, left side  diagnosed in   2009   Had radiation treatments and lumpectomy   Tamoxifen ended 2014       3. HYPERCALCIURIA  : MORE THAN 400 IN 25 HOURS  In June 2018   STARTed ON HCTZ 25 MG A DAY  In June 2018   But decreased to HCTZ  To 12.5 mg a day    For hypokalemia    Asked  Pt to increase HCTZ 25 mg a day   On  repeat 24 hr calcium after 2 months on 25 MG OF  hctz, great success with urine calcium to 214  From over 450       4.  Thyromegaly - usg  Oct 2018  Showed presence of 2 thyroid nodules on right side, Right thyroid lobe showed 2-3  nodules   # 1 nodule of size at right superior pole of size 1.37 cm by 1.32 cm by 1 cm, regular border, hypoechoic, not vascular   # 2 nodule of size 1.98  cm by 0.58 cm by 2.4cm regular border, hypoechoic, not vascular    Left thyroid lobe showed one nodules   # 1 nodule of size 1.48 cm by 0.62 cm by 0.5 cm     FNA  :  negative for cancer from December 2018    F/u next year 2021        5. DM 2 - managed by pcp      6.   Renal calculus- s/p surgery  -       > 50 % of time is spent on counseling   Patient voiced understanding her plan of care

## 2020-06-17 NOTE — PROGRESS NOTES
1. Have you been to the ER, urgent care clinic since your last visit? May 2020/[facility] Hospitalized since your last visit?1 day    2. Have you seen or consulted any other health care providers outside of the [address] [place] [address] since your last visit? Include any pap smears or colon screening.  No    Wt Readings from Last 3 Encounters:   06/17/20 178 lb 3.2 oz (80.8 kg)   11/20/19 171 lb (77.6 kg)   11/13/19 170 lb 12.8 oz (77.5 kg)     Temp Readings from Last 3 Encounters:   06/17/20 97.5 °F (36.4 °C) (Oral)   11/20/19 97.8 °F (36.6 °C) (Oral)   11/13/19 97.5 °F (36.4 °C) (Oral)     BP Readings from Last 3 Encounters:   06/17/20 122/76   11/20/19 122/74   11/13/19 129/67     Pulse Readings from Last 3 Encounters:   06/17/20 78   11/20/19 82   11/13/19 81     Patient was tested for COVID 19 (Negative)

## 2020-12-11 LAB
25(OH)D3 SERPL-MCNC: 28.7 NG/ML (ref 30–100)
ALBUMIN SERPL-MCNC: 4 G/DL (ref 3.5–5)
ALBUMIN/GLOB SERPL: 1.1 {RATIO} (ref 1.1–2.2)
ALP SERPL-CCNC: 63 U/L (ref 45–117)
ALT SERPL-CCNC: 24 U/L (ref 12–78)
ANION GAP SERPL CALC-SCNC: 5 MMOL/L (ref 5–15)
AST SERPL-CCNC: 12 U/L (ref 15–37)
BILIRUB SERPL-MCNC: 0.5 MG/DL (ref 0.2–1)
BUN SERPL-MCNC: 13 MG/DL (ref 6–20)
BUN/CREAT SERPL: 18 (ref 12–20)
CALCIUM SERPL-MCNC: 9.3 MG/DL (ref 8.5–10.1)
CALCIUM SERPL-MCNC: 9.6 MG/DL (ref 8.5–10.1)
CHLORIDE SERPL-SCNC: 104 MMOL/L (ref 97–108)
CO2 SERPL-SCNC: 31 MMOL/L (ref 21–32)
CREAT SERPL-MCNC: 0.71 MG/DL (ref 0.55–1.02)
GLOBULIN SER CALC-MCNC: 3.6 G/DL (ref 2–4)
GLUCOSE SERPL-MCNC: 147 MG/DL (ref 65–100)
POTASSIUM SERPL-SCNC: 3.4 MMOL/L (ref 3.5–5.1)
PROT SERPL-MCNC: 7.6 G/DL (ref 6.4–8.2)
PTH-INTACT SERPL-MCNC: 27.6 PG/ML (ref 18.4–88)
SODIUM SERPL-SCNC: 140 MMOL/L (ref 136–145)
TSH SERPL DL<=0.05 MIU/L-ACNC: 0.56 UIU/ML (ref 0.36–3.74)

## 2020-12-17 DIAGNOSIS — M81.0 SENILE OSTEOPOROSIS: ICD-10-CM

## 2020-12-17 DIAGNOSIS — R82.994 HYPERCALCIURIA: ICD-10-CM

## 2020-12-17 DIAGNOSIS — E04.2 MULTINODULAR GOITER: ICD-10-CM

## 2021-01-05 ENCOUNTER — OFFICE VISIT (OUTPATIENT)
Dept: ENDOCRINOLOGY | Age: 71
End: 2021-01-05
Payer: COMMERCIAL

## 2021-01-05 VITALS
WEIGHT: 171 LBS | HEIGHT: 63 IN | TEMPERATURE: 98.4 F | OXYGEN SATURATION: 96 % | HEART RATE: 97 BPM | DIASTOLIC BLOOD PRESSURE: 66 MMHG | SYSTOLIC BLOOD PRESSURE: 120 MMHG | BODY MASS INDEX: 30.3 KG/M2 | RESPIRATION RATE: 18 BRPM

## 2021-01-05 DIAGNOSIS — R82.994 HYPERCALCIURIA: ICD-10-CM

## 2021-01-05 DIAGNOSIS — M81.0 SENILE OSTEOPOROSIS: Primary | ICD-10-CM

## 2021-01-05 DIAGNOSIS — E04.2 MULTINODULAR GOITER: ICD-10-CM

## 2021-01-05 PROCEDURE — 1090F PRES/ABSN URINE INCON ASSESS: CPT | Performed by: INTERNAL MEDICINE

## 2021-01-05 PROCEDURE — G8417 CALC BMI ABV UP PARAM F/U: HCPCS | Performed by: INTERNAL MEDICINE

## 2021-01-05 PROCEDURE — 1101F PT FALLS ASSESS-DOCD LE1/YR: CPT | Performed by: INTERNAL MEDICINE

## 2021-01-05 PROCEDURE — G8427 DOCREV CUR MEDS BY ELIG CLIN: HCPCS | Performed by: INTERNAL MEDICINE

## 2021-01-05 PROCEDURE — 99214 OFFICE O/P EST MOD 30 MIN: CPT | Performed by: INTERNAL MEDICINE

## 2021-01-05 PROCEDURE — G8536 NO DOC ELDER MAL SCRN: HCPCS | Performed by: INTERNAL MEDICINE

## 2021-01-05 PROCEDURE — 3017F COLORECTAL CA SCREEN DOC REV: CPT | Performed by: INTERNAL MEDICINE

## 2021-01-05 PROCEDURE — G8510 SCR DEP NEG, NO PLAN REQD: HCPCS | Performed by: INTERNAL MEDICINE

## 2021-01-05 RX ORDER — HYDROCODONE BITARTRATE AND ACETAMINOPHEN 5; 325 MG/1; MG/1
1 TABLET ORAL
COMMUNITY

## 2021-01-05 RX ORDER — DENOSUMAB 60 MG/ML
60 INJECTION SUBCUTANEOUS ONCE
Qty: 1 ML | Refills: 0 | Status: SHIPPED | COMMUNITY
Start: 2021-01-05 | End: 2021-01-05

## 2021-01-05 NOTE — PATIENT INSTRUCTIONS
SPECIFIC INSTRUCTIONS BELOW  
 
 
 
-------------------------------------------------------------------------------------------- Refills    -    please call your pharmacy and have them send us a refill request 
 
Results  -  allow up to a week for lab results to be processed and reviewed. Phone calls  -  Allow upto 24 hrs. for non-urgent calls to be retained Prior authorization - It may take up to 4 weeks to process, depending on your insurance Forms  -  FMLA, DMV, patient assistance, etc. will take up to 2 weeks to process Cancellations - please notify the office in advance if you cannot keep your appointment Samples  - will only be dispensed at visits as supply is limited If you are having a medical emergency call 911 
 
-------------------------------------------------------------------------------------------- HCTZ to 25 mg a day  ( stop 12.5 mg dose ) Keep kcl 10 meq in hand , use as needed ( 30 pills - 12 refills ) 
 
--------------------------------------------------------------------------------------------- Please call office if there is any injection site reactions like redness or swelling Call 911 or go to Emergency room if you are feeling dizzy and developing shortness of breath PAY ATTENTION TO THESE GENERAL INSTRUCTIONS  
 
- HEALTH MAINTENANCE IS NOT UP TO DATE  
- YOUR MED LIST IS NOT UP TO DATE AS SOME CHANGES ARE BEING MADE AFTER THE VISIT - FOLLOW SPECIFIC INSTRUCTIONS ABOVE Results *Normal results will not be notified by a phone call starting January 1 2021 *If you have an upcoming visit, the results will be discussed at the visit *Please sign up for MY CHART if you want access to your lab and test results *Abnormal results which require immediate attention will be notified by phone call *Abnormal results which do not require immediate assistance will be notified in 1-2 weeks Refills    -    have your pharmacy send us a refill request 
Phone calls  -  Allow  24 hrs. for non-urgent calls to be returned Prior authorization - It may take 2-4 weeks to process Forms  -  FMLA, DMV etc., will take up to 2 weeks to process Cancellations - please notify the office 2 days in advance Samples  - will only be dispensed at visits  
 
--------------------------------------------------------------------------------------------

## 2021-01-05 NOTE — LETTER
1/5/2021 Patient: Apollo Greer YOB: 1950 Date of Visit: 1/5/2021 Kae Godoy MD 
[address] [address] Via Fax: [phone] Dear Kae Godoy MD, Thank you for referring Ms. Jaylyn Mojica to [facility] for evaluation. My notes for this consultation are attached. If you have questions, please do not hesitate to call me. I look forward to following your patient along with you. Sincerely, Zulma Avalos MD

## 2021-01-05 NOTE — PROGRESS NOTES
1. Have you been to the ER, urgent care clinic since your last visit?no  Hospitalized since your last visit?no    2. Have you seen or consulted any other health care providers outside of the [address] [place] [address] since your last visit? Include any pap smears or colon screening.  no    Wt Readings from Last 3 Encounters:   01/05/21 171 lb (77.6 kg)   06/17/20 178 lb 3.2 oz (80.8 kg)   11/20/19 171 lb (77.6 kg)     Temp Readings from Last 3 Encounters:   01/05/21 98.4 °F (36.9 °C) (Oral)   06/17/20 97.5 °F (36.4 °C) (Oral)   11/20/19 97.8 °F (36.6 °C) (Oral)     BP Readings from Last 3 Encounters:   01/05/21 120/66   06/17/20 122/76   11/20/19 122/74     Pulse Readings from Last 3 Encounters:   01/05/21 97   06/17/20 78   11/20/19 82

## 2021-01-05 NOTE — PROGRESS NOTES
Prolia administered in left arm. No c/o pain or discomfort voiced.   [employer]  [employer] [id number]  Lot [id number]  Exp 04/23  Patient Supply

## 2021-01-05 NOTE — PROGRESS NOTES
HISTORY OF PRESENT ILLNESS  Vale Oates is a 65 y.o. female. HPI   f/u after  Last  visit for osteoporosis management  And  multinodular  goiter  From  June 2020     She had seen pain specialist   She had MRI of spine which showed disc prolapse L4-L5         June 2020   To get Prolia shot today    In the interim,  She had surgery in May 2020 ,  for a large stone on right kidney, which was removed           Osteoporosis  Patient complains of osteoporosis. She was diagnosed with osteoporosis by bone density scan in nov 2017 . Patient denies history of fracture. The cause of osteoporosis is felt to be due to postmenopausal estrogen deficiency. She is currently being treated with calcium and vitamin D supplementation.   She is currently being treated with bisphosphonates   Osteoporosis Risk Factors   Nonmodifiable  Personal Hx of fracture as an adult: no  Hx of fracture in first-degree relative: no   race: no  Advanced age: yes  Female sex: yes  Dementia: no  Poor health/frailty: no     Potentially modifiable:  Tobacco use: no  Low body weight (<127 lbs): no  Estrogen deficiency     early menopause (age <45) or bilateral ovariectomy: no     prolonged premenopausal amenorrhea (>1 yr): no  Low calcium intake (lifelong): no  Alcoholism: no  Recurrent falls: no  Inadequate physical activity: no          Past Medical History:   Diagnosis Date    High cholesterol     Hypertension     Osteoporosis        Social History     Socioeconomic History    Marital status: SINGLE     Spouse name: Not on file    Number of children: Not on file    Years of education: Not on file    Highest education level: Not on file   Occupational History    Not on file   Social Needs    Financial resource strain: Not on file    Food insecurity     Worry: Not on file     Inability: Not on file    Transportation needs     Medical: Not on file     Non-medical: Not on file   Tobacco Use    Smoking status: Light Tobacco Smoker    Smokeless tobacco: Never Used   Substance and Sexual Activity    Alcohol use: Yes    Drug use: No    Sexual activity: Not on file   Lifestyle    Physical activity     Days per week: Not on file     Minutes per session: Not on file    Stress: Not on file   Relationships    Social connections     Talks on phone: Not on file     Gets together: Not on file     Attends Zoroastrian service: Not on file     Active member of club or organization: Not on file     Attends meetings of clubs or organizations: Not on file     Relationship status: Not on file    Intimate partner violence     Fear of current or ex partner: Not on file     Emotionally abused: Not on file     Physically abused: Not on file     Forced sexual activity: Not on file   Other Topics Concern    Not on file   Social History Narrative    Not on file       History reviewed. No pertinent family history. Review of Systems   Constitutional: Negative. HENT: Negative. Eyes: Negative for pain and redness. Respiratory: Negative. Cardiovascular: Negative for chest pain, palpitations and leg swelling. Gastrointestinal: Negative. Negative for constipation. Genitourinary: Negative. Musculoskeletal: Positive for back pain, joint pain and myalgias. Skin: Negative. Neurological: Negative. Endo/Heme/Allergies: Negative. Psychiatric/Behavioral: Negative for depression and memory loss. The patient does not have insomnia. Physical Exam   Constitutional: She is oriented to person, place, and time. She appears well-developed and well-nourished. HENT:   Head: Normocephalic. Eyes: Conjunctivae and EOM are normal. Pupils are equal, round, and reactive to light. Neck: Normal range of motion. Neck supple. No JVD present. No tracheal deviation present. thyromegaly present. Cardiovascular: Normal rate, regular rhythm and normal heart sounds. No murmur heard. Pulmonary/Chest: Breath sounds normal.   Abdominal: Soft.  Bowel sounds are normal.   Musculoskeletal: Normal range of motion. Lymphadenopathy:     She has no cervical adenopathy. Neurological: She is alert and oriented to person, place, and time. She has normal reflexes. Skin: Skin is warm. Psychiatric: She has a normal mood and affect. Reviewed labs   Has 24 hr urine  Calcium - over 400 mg     ASSESSMENT and PLAN    1. Osteoporosis :   Date : dec 2019   Bone DEXA  ap spine T-score -1.9; left femoral Neck T- score  -2.1, right femoral neck T-score-2.2   Date : nov 2017  Bone DEXA  ap spine T-score -2.9; left femoral Neck T- score  -2.4, right femoral neck T-score -2.5  Date : jan 19 2012  Bone DEXA  ap spine T-score -1.5 ( L2 is -2.5); left femoral Neck T- score  -2.2, right femoral neck T-score-2.3    AGE, tamoxifen AND HYPERCALCIURIA      She finished  primary screening for secondary causes of osteopOrosis (thyroid, parathyroid,  CAME POSITIVE FOR hypercalciuria, vitamin D def, multiple myeloma, celiac sprue). Discussed  starting on Prolia  twice yearly once insurance approval is done. Medication benefits, side-effects and adverse effects discussed in detail with patient and family. Patient is agreeable and started her on First shot today April 2018 ; second in oc 2018 ; And third in April 2019  ; She is due for   Fourth shot  Nov 2019, 5th June 2020; 6th shot  dec /bc  2021       She is doing well on Prolia   Fall precautions discussed with the patient   Patient will take low doses of calcium and vit  D given her hypercalciuria and renal stone    2. H/o Breast cancer, left side  diagnosed in   2009   Had radiation treatments and lumpectomy   Tamoxifen ended 2014       3.  HYPERCALCIURIA  : MORE THAN 400 IN 25 HOURS  In June 2018   STARTed ON HCTZ 25 MG A DAY  In June 2018   But decreased to HCTZ  To 12.5 mg a day    For hypokalemia  Asked  Pt to increase HCTZ 25 mg a day  As she needed more control on hypercalcemia   On  repeat 24 hr calcium after 2 months on 25 MG OF  hctz, great success with urine calcium to 214  From over 450     Jan 2021 - she still has low potassium   Start on kcl 10 meq a day       4. Thyromegaly - usg  Oct 2018  Showed presence of 2 thyroid nodules on right side, Right thyroid lobe showed 2-3  nodules   # 1 nodule of size at right superior pole of size 1.37 cm by 1.32 cm by 1 cm, regular border, hypoechoic, not vascular   # 2 nodule of size 1.98  cm by 0.58 cm by 2.4cm regular border, hypoechoic, not vascular    Left thyroid lobe showed one nodules   # 1 nodule of size 1.48 cm by 0.62 cm by 0.5 cm     FNA  :  negative for cancer from December 2018  Will F/u next year 2021        5. DM 2 - managed by pcp      6.   Renal calculus- s/p surgery  -       7- new diagnosis of disc prolapse :  F/u with pain management       > 50 % of time is spent on counseling   Patient voiced understanding her plan of care

## 2021-01-13 RX ORDER — POTASSIUM CHLORIDE 20 MEQ/1
20 TABLET, EXTENDED RELEASE ORAL
Qty: 30 TAB | Refills: 12 | Status: SHIPPED | OUTPATIENT
Start: 2021-01-13 | End: 2022-01-19 | Stop reason: SDUPTHER

## 2021-02-09 RX ORDER — HYDROCHLOROTHIAZIDE 12.5 MG/1
12.5 TABLET ORAL DAILY
Qty: 90 TAB | Refills: 4 | Status: SHIPPED | OUTPATIENT
Start: 2021-02-09 | End: 2021-10-04

## 2021-06-30 LAB
25(OH)D3+25(OH)D2 SERPL-MCNC: 45.7 NG/ML (ref 30–100)
ALBUMIN SERPL-MCNC: 4.1 G/DL (ref 3.7–4.7)
ALBUMIN/GLOB SERPL: 1.1 {RATIO} (ref 1.2–2.2)
ALP SERPL-CCNC: 64 IU/L (ref 48–121)
ALT SERPL-CCNC: 14 IU/L (ref 0–32)
AST SERPL-CCNC: 20 IU/L (ref 0–40)
BASOPHILS # BLD AUTO: 0.1 X10E3/UL (ref 0–0.2)
BASOPHILS NFR BLD AUTO: 1 %
BILIRUB SERPL-MCNC: 0.3 MG/DL (ref 0–1.2)
BUN SERPL-MCNC: 12 MG/DL (ref 8–27)
BUN/CREAT SERPL: 21 (ref 12–28)
CALCIUM SERPL-MCNC: 9.8 MG/DL (ref 8.7–10.3)
CHLORIDE SERPL-SCNC: 96 MMOL/L (ref 96–106)
CO2 SERPL-SCNC: 27 MMOL/L (ref 20–29)
CREAT SERPL-MCNC: 0.57 MG/DL (ref 0.57–1)
EOSINOPHIL # BLD AUTO: 0.1 X10E3/UL (ref 0–0.4)
EOSINOPHIL NFR BLD AUTO: 1 %
ERYTHROCYTE [DISTWIDTH] IN BLOOD BY AUTOMATED COUNT: 13.8 % (ref 11.7–15.4)
GLOBULIN SER CALC-MCNC: 3.6 G/DL (ref 1.5–4.5)
GLUCOSE SERPL-MCNC: 131 MG/DL (ref 65–99)
HCT VFR BLD AUTO: 36.7 % (ref 34–46.6)
HGB BLD-MCNC: 12.3 G/DL (ref 11.1–15.9)
IMM GRANULOCYTES # BLD AUTO: 0 X10E3/UL (ref 0–0.1)
IMM GRANULOCYTES NFR BLD AUTO: 0 %
LYMPHOCYTES # BLD AUTO: 2.3 X10E3/UL (ref 0.7–3.1)
LYMPHOCYTES NFR BLD AUTO: 34 %
MCH RBC QN AUTO: 30.1 PG (ref 26.6–33)
MCHC RBC AUTO-ENTMCNC: 33.5 G/DL (ref 31.5–35.7)
MCV RBC AUTO: 90 FL (ref 79–97)
MONOCYTES # BLD AUTO: 0.4 X10E3/UL (ref 0.1–0.9)
MONOCYTES NFR BLD AUTO: 5 %
NEUTROPHILS # BLD AUTO: 3.9 X10E3/UL (ref 1.4–7)
NEUTROPHILS NFR BLD AUTO: 59 %
PLATELET # BLD AUTO: 388 X10E3/UL (ref 150–450)
POTASSIUM SERPL-SCNC: 3.8 MMOL/L (ref 3.5–5.2)
PROT SERPL-MCNC: 7.7 G/DL (ref 6–8.5)
RBC # BLD AUTO: 4.08 X10E6/UL (ref 3.77–5.28)
SODIUM SERPL-SCNC: 142 MMOL/L (ref 134–144)
TSH SERPL DL<=0.005 MIU/L-ACNC: 0.92 UIU/ML (ref 0.45–4.5)
WBC # BLD AUTO: 6.6 X10E3/UL (ref 3.4–10.8)

## 2021-07-19 ENCOUNTER — OFFICE VISIT (OUTPATIENT)
Dept: ENDOCRINOLOGY | Age: 71
End: 2021-07-19
Payer: COMMERCIAL

## 2021-07-19 VITALS
OXYGEN SATURATION: 98 % | BODY MASS INDEX: 29.06 KG/M2 | SYSTOLIC BLOOD PRESSURE: 113 MMHG | WEIGHT: 164 LBS | DIASTOLIC BLOOD PRESSURE: 62 MMHG | HEIGHT: 63 IN | HEART RATE: 80 BPM | TEMPERATURE: 98.6 F

## 2021-07-19 DIAGNOSIS — E55.9 VITAMIN D DEFICIENCY: ICD-10-CM

## 2021-07-19 DIAGNOSIS — M81.0 SENILE OSTEOPOROSIS: Primary | ICD-10-CM

## 2021-07-19 DIAGNOSIS — E04.2 MULTINODULAR GOITER: ICD-10-CM

## 2021-07-19 DIAGNOSIS — R82.994 HYPERCALCIURIA: ICD-10-CM

## 2021-07-19 PROCEDURE — G8417 CALC BMI ABV UP PARAM F/U: HCPCS | Performed by: INTERNAL MEDICINE

## 2021-07-19 PROCEDURE — 1101F PT FALLS ASSESS-DOCD LE1/YR: CPT | Performed by: INTERNAL MEDICINE

## 2021-07-19 PROCEDURE — 1090F PRES/ABSN URINE INCON ASSESS: CPT | Performed by: INTERNAL MEDICINE

## 2021-07-19 PROCEDURE — G8536 NO DOC ELDER MAL SCRN: HCPCS | Performed by: INTERNAL MEDICINE

## 2021-07-19 PROCEDURE — G8427 DOCREV CUR MEDS BY ELIG CLIN: HCPCS | Performed by: INTERNAL MEDICINE

## 2021-07-19 PROCEDURE — G8510 SCR DEP NEG, NO PLAN REQD: HCPCS | Performed by: INTERNAL MEDICINE

## 2021-07-19 PROCEDURE — 99214 OFFICE O/P EST MOD 30 MIN: CPT | Performed by: INTERNAL MEDICINE

## 2021-07-19 PROCEDURE — 3017F COLORECTAL CA SCREEN DOC REV: CPT | Performed by: INTERNAL MEDICINE

## 2021-07-19 RX ORDER — DENOSUMAB 60 MG/ML
60 INJECTION SUBCUTANEOUS ONCE
Qty: 1 ML | Refills: 0 | Status: SHIPPED | COMMUNITY
Start: 2021-07-19 | End: 2021-07-19

## 2021-07-19 NOTE — LETTER
7/19/2021    Patient: Mohit Godoy   YOB: 1950   Date of Visit: 7/19/2021     Makenna Su MD  [address] [address]  Via Fax: [phone]    Dear Makenna Su MD,      Thank you for referring Ms. Pricila Cuevas to [facility] for evaluation. My notes for this consultation are attached. If you have questions, please do not hesitate to call me. I look forward to following your patient along with you.       Sincerely,    Pat Jones MD

## 2021-07-19 NOTE — PROGRESS NOTES
HISTORY OF PRESENT ILLNESS  Blade Jean is a 66 y.o. female. HPI   f/u after  Last  visit for osteoporosis management  And  multinodular  goiter  From  Jan 2021     Here for Prolia shot           Jan 2021     She had seen pain specialist   She had MRI of spine which showed disc prolapse L4-L5         June 2020   To get Prolia shot today    In the interim,  She had surgery in May 2020 ,  for a large stone on right kidney, which was removed       Osteoporosis  Patient complains of osteoporosis. She was diagnosed with osteoporosis by bone density scan in nov 2017 . Patient denies history of fracture. The cause of osteoporosis is felt to be due to postmenopausal estrogen deficiency. She is currently being treated with calcium and vitamin D supplementation. She is currently being treated with bisphosphonates             Review of Systems   Constitutional: Negative. Musculoskeletal: Positive for back pain, joint pain and myalgias. Psychiatric/Behavioral: Negative for depression and memory loss. The patient does not have insomnia. Physical Exam   Constitutional: She is oriented to person, place, and time. She appears well-developed and well-nourished. Neck: Normal range of motion. Neck supple. No JVD present. No tracheal deviation present. thyromegaly present. Psychiatric: She has a normal mood and affect. Reviewed labs   Has 24 hr urine  Calcium - over 400 mg     ASSESSMENT and PLAN    1.  Osteoporosis : Due for dexa dec 2021   Date : dec 2019   Bone DEXA  ap spine T-score -1.9; left femoral Neck T- score  -2.1, right femoral neck T-score-2.2   Date : nov 2017  Bone DEXA  ap spine T-score -2.9; left femoral Neck T- score  -2.4, right femoral neck T-score -2.5  Date : jan 19 2012  Bone DEXA  ap spine T-score -1.5 ( L2 is -2.5); left femoral Neck T- score  -2.2, right femoral neck T-score-2.3    AGE, tamoxifen AND HYPERCALCIURIA      She finished  primary screening for secondary causes of osteopOrosis (thyroid, parathyroid,  CAME POSITIVE FOR hypercalciuria, vitamin D def, multiple myeloma, celiac sprue). Discussed  starting on Prolia  twice yearly once insurance approval is done. Medication benefits, side-effects and adverse effects discussed in detail with patient and family. Patient is agreeable and started her on First shot today April 2018 ; second in oc 2018 ; And third in April 2019  ; She is due for   Fourth shot  Nov 2019, 5th June 2020; 6th shot  dec /bc  2021  ;  7th shot   July 2021  ;  8th   Shot jan 19 2022    She is doing well on Prolia   Fall precautions discussed with the patient   Patient will take low doses of calcium and vit  D given her hypercalciuria and renal stone    2. H/o Breast cancer, left side  diagnosed in   2009   Had radiation treatments and lumpectomy   Tamoxifen ended 2014       3. HYPERCALCIURIA  : MORE THAN 400 IN 25 HOURS  In June 2018   STARTed ON HCTZ 25 MG A DAY  In June 2018   But decreased to HCTZ  To 12.5 mg a day    For hypokalemia  Asked  Pt to increase HCTZ 25 mg a day  As she needed more control on hypercalcemia   On  repeat 24 hr calcium after 2 months on 25 MG OF  hctz, great success with urine calcium to 214  From over 450     Jan 2021 - she still has low potassium   Start on kcl 10 meq a day       4. Thyromegaly - usg  Oct 2018  Showed presence of 2 thyroid nodules on right side, Right thyroid lobe showed 2-3  nodules   # 1 nodule of size at right superior pole of size 1.37 cm by 1.32 cm by 1 cm, regular border, hypoechoic, not vascular   # 2 nodule of size 1.98  cm by 0.58 cm by 2.4cm regular border, hypoechoic, not vascular    Left thyroid lobe showed one nodules   # 1 nodule of size 1.48 cm by 0.62 cm by 0.5 cm     FNA  -  negative for cancer from December 2018  Ordered  Ultrasound   -    For follow up     5. DM 2 - managed by pcp    6.   Renal calculus- s/p surgery  -         Reviewed results with patient and discussed the labs being ordered today/bnv  Patient voiced understanding of plan of care

## 2021-07-19 NOTE — PATIENT INSTRUCTIONS
SPECIFIC INSTRUCTIONS BELOW         Please call office if there is any injection site reactions like redness or swelling     Call 911 or go to Emergency room if you are feeling dizzy and developing shortness of breath            -------------PAY ATTENTION TO THESE GENERAL INSTRUCTIONS -----------------    -ANY tests other than blood work, which you opt to do  outside the  [facility] imaging facilities, you are responsible for prior authorizations if  required    - [address] UP TO DATE ON YOUR AVS- PLEASE IGNORE   - YOUR MED LIST IS NOT UP TO DATE AS SOME CHANGES ARE BEING MADE AFTER THE VISIT - [address]     Results     *Normal results will not be notified by a phone call starting January 1 2021   *If you have an upcoming visit, the results will be discussed at the visit   *Please sign up for MY CHART if you want access to your lab and test results  *Abnormal results which require immediate attention will be notified by phone call   *Abnormal results which do not require immediate assistance will be notified in 1-2 weeks       Refills    -    have your pharmacy send us a refill request . Refills are done max for one year and a visit is a must before refills are extended    Follow up appointments -  highly encourage you to make it when you are checking out. We can accommodate you into the schedule based on your clinical situation, but not for extending refills beyond a year. Labs are important to give refills and is important to get labs before the visit     Phone calls  -  Allow  24 hrs.  for non-urgent calls to be returned  Prior authorization - It may take 2-4 weeks to process  Forms  -  FMLA, DMV etc., will take up to 2 weeks to process  Cancellations - please notify the office 2 days in advance   Samples  - will only be dispensed at visits       If not showing for the appointments and cancelling appointments within 24 hours are kept track of and three  of such situations in  two consecutive years will likely be considered for termination from the practice    -------------------------------------------------------------------------------------------------------------------

## 2021-07-19 NOTE — PROGRESS NOTES
Prolia injection administered into Left arm. No c/o pain or discomfort voiced.   Patient supplied  [facility]. [facility] # [id number]  Lot [id number]  Exp: 08/23

## 2021-10-04 RX ORDER — HYDROCHLOROTHIAZIDE 12.5 MG/1
12.5 TABLET ORAL DAILY
Qty: 90 TABLET | Refills: 4 | Status: SHIPPED | OUTPATIENT
Start: 2021-10-04 | End: 2022-01-19 | Stop reason: SDUPTHER

## 2021-10-28 LAB
CREATININE, EXTERNAL: 0.57
HBA1C MFR BLD HPLC: 6.4 %

## 2021-12-05 RX ORDER — DENOSUMAB 60 MG/ML
INJECTION SUBCUTANEOUS
Qty: 1 EACH | Refills: 0 | Status: SHIPPED | OUTPATIENT
Start: 2021-12-05 | End: 2022-07-14 | Stop reason: SDUPTHER

## 2021-12-07 ENCOUNTER — HOSPITAL ENCOUNTER (OUTPATIENT)
Dept: MAMMOGRAPHY | Age: 71
Discharge: HOME | End: 2021-12-07
Attending: INTERNAL MEDICINE
Payer: MEDICARE

## 2021-12-07 DIAGNOSIS — M81.0 SENILE OSTEOPOROSIS: ICD-10-CM

## 2021-12-07 DIAGNOSIS — E04.2 MULTINODULAR GOITER: ICD-10-CM

## 2021-12-07 PROCEDURE — 76536 US EXAM OF HEAD AND NECK: CPT

## 2021-12-07 PROCEDURE — 77080 DXA BONE DENSITY AXIAL: CPT

## 2021-12-16 NOTE — PROGRESS NOTES
Patient  ultrasound indicates biopsy of right and left nodules.    These have been biopsied earlier and will discuss more at the visit in jan 2022

## 2022-01-01 PROBLEM — I10 PRIMARY HYPERTENSION: Status: ACTIVE | Noted: 2022-01-01

## 2022-01-01 PROBLEM — E11.9 CONTROLLED TYPE 2 DIABETES MELLITUS WITHOUT COMPLICATION, WITHOUT LONG-TERM CURRENT USE OF INSULIN (HCC): Status: ACTIVE | Noted: 2022-01-01

## 2022-01-13 LAB
25(OH)D3+25(OH)D2 SERPL-MCNC: 36.5 NG/ML (ref 30–100)
ALBUMIN SERPL-MCNC: 4.4 G/DL (ref 3.7–4.7)
ALBUMIN/GLOB SERPL: 1.4 {RATIO} (ref 1.2–2.2)
ALP SERPL-CCNC: 62 IU/L (ref 44–121)
ALT SERPL-CCNC: 14 IU/L (ref 0–32)
AST SERPL-CCNC: 15 IU/L (ref 0–40)
BASOPHILS # BLD AUTO: 0 X10E3/UL (ref 0–0.2)
BASOPHILS NFR BLD AUTO: 1 %
BILIRUB SERPL-MCNC: 0.4 MG/DL (ref 0–1.2)
BUN SERPL-MCNC: 9 MG/DL (ref 8–27)
BUN/CREAT SERPL: 13 (ref 12–28)
CALCIUM SERPL-MCNC: 9.7 MG/DL (ref 8.7–10.3)
CHLORIDE SERPL-SCNC: 104 MMOL/L (ref 96–106)
CO2 SERPL-SCNC: 25 MMOL/L (ref 20–29)
CREAT SERPL-MCNC: 0.7 MG/DL (ref 0.57–1)
EOSINOPHIL # BLD AUTO: 0.1 X10E3/UL (ref 0–0.4)
EOSINOPHIL NFR BLD AUTO: 1 %
ERYTHROCYTE [DISTWIDTH] IN BLOOD BY AUTOMATED COUNT: 12.7 % (ref 11.7–15.4)
GLOBULIN SER CALC-MCNC: 3.2 G/DL (ref 1.5–4.5)
GLUCOSE SERPL-MCNC: 106 MG/DL (ref 65–99)
HCT VFR BLD AUTO: 37.1 % (ref 34–46.6)
HGB BLD-MCNC: 12.8 G/DL (ref 11.1–15.9)
IMM GRANULOCYTES # BLD AUTO: 0 X10E3/UL (ref 0–0.1)
IMM GRANULOCYTES NFR BLD AUTO: 0 %
LYMPHOCYTES # BLD AUTO: 1.8 X10E3/UL (ref 0.7–3.1)
LYMPHOCYTES NFR BLD AUTO: 25 %
MCH RBC QN AUTO: 30.8 PG (ref 26.6–33)
MCHC RBC AUTO-ENTMCNC: 34.5 G/DL (ref 31.5–35.7)
MCV RBC AUTO: 89 FL (ref 79–97)
MONOCYTES # BLD AUTO: 0.5 X10E3/UL (ref 0.1–0.9)
MONOCYTES NFR BLD AUTO: 6 %
NEUTROPHILS # BLD AUTO: 4.9 X10E3/UL (ref 1.4–7)
NEUTROPHILS NFR BLD AUTO: 67 %
PLATELET # BLD AUTO: 351 X10E3/UL (ref 150–450)
POTASSIUM SERPL-SCNC: 3.7 MMOL/L (ref 3.5–5.2)
PROT SERPL-MCNC: 7.6 G/DL (ref 6–8.5)
RBC # BLD AUTO: 4.15 X10E6/UL (ref 3.77–5.28)
SODIUM SERPL-SCNC: 145 MMOL/L (ref 134–144)
TSH SERPL DL<=0.005 MIU/L-ACNC: 0.84 UIU/ML (ref 0.45–4.5)
WBC # BLD AUTO: 7.3 X10E3/UL (ref 3.4–10.8)

## 2022-01-19 ENCOUNTER — OFFICE VISIT (OUTPATIENT)
Dept: ENDOCRINOLOGY | Age: 72
End: 2022-01-19
Payer: MEDICARE

## 2022-01-19 VITALS
DIASTOLIC BLOOD PRESSURE: 69 MMHG | BODY MASS INDEX: 29.41 KG/M2 | HEART RATE: 87 BPM | WEIGHT: 166 LBS | SYSTOLIC BLOOD PRESSURE: 137 MMHG | RESPIRATION RATE: 18 BRPM | TEMPERATURE: 98.4 F | HEIGHT: 63 IN | OXYGEN SATURATION: 98 %

## 2022-01-19 DIAGNOSIS — R82.994 HYPERCALCIURIA: ICD-10-CM

## 2022-01-19 DIAGNOSIS — E04.2 MULTINODULAR GOITER: ICD-10-CM

## 2022-01-19 DIAGNOSIS — E04.9 THYROID GOITER: ICD-10-CM

## 2022-01-19 DIAGNOSIS — E55.9 VITAMIN D DEFICIENCY: ICD-10-CM

## 2022-01-19 DIAGNOSIS — E01.0 THYROMEGALY: ICD-10-CM

## 2022-01-19 DIAGNOSIS — M81.0 SENILE OSTEOPOROSIS: Primary | ICD-10-CM

## 2022-01-19 PROCEDURE — 99214 OFFICE O/P EST MOD 30 MIN: CPT | Performed by: INTERNAL MEDICINE

## 2022-01-19 PROCEDURE — G8754 DIAS BP LESS 90: HCPCS | Performed by: INTERNAL MEDICINE

## 2022-01-19 PROCEDURE — G8752 SYS BP LESS 140: HCPCS | Performed by: INTERNAL MEDICINE

## 2022-01-19 PROCEDURE — G8419 CALC BMI OUT NRM PARAM NOF/U: HCPCS | Performed by: INTERNAL MEDICINE

## 2022-01-19 PROCEDURE — G8536 NO DOC ELDER MAL SCRN: HCPCS | Performed by: INTERNAL MEDICINE

## 2022-01-19 PROCEDURE — G8427 DOCREV CUR MEDS BY ELIG CLIN: HCPCS | Performed by: INTERNAL MEDICINE

## 2022-01-19 PROCEDURE — G8510 SCR DEP NEG, NO PLAN REQD: HCPCS | Performed by: INTERNAL MEDICINE

## 2022-01-19 PROCEDURE — 3017F COLORECTAL CA SCREEN DOC REV: CPT | Performed by: INTERNAL MEDICINE

## 2022-01-19 PROCEDURE — 1090F PRES/ABSN URINE INCON ASSESS: CPT | Performed by: INTERNAL MEDICINE

## 2022-01-19 PROCEDURE — 1101F PT FALLS ASSESS-DOCD LE1/YR: CPT | Performed by: INTERNAL MEDICINE

## 2022-01-19 RX ORDER — HYDROCHLOROTHIAZIDE 12.5 MG/1
12.5 TABLET ORAL DAILY
Qty: 90 TABLET | Refills: 4 | Status: SHIPPED | OUTPATIENT
Start: 2022-01-19 | End: 2022-05-17 | Stop reason: SDUPTHER

## 2022-01-19 RX ORDER — DENOSUMAB 60 MG/ML
60 INJECTION SUBCUTANEOUS ONCE
Qty: 1 ML | Refills: 0 | Status: SHIPPED | COMMUNITY
Start: 2022-01-19 | End: 2022-01-19

## 2022-01-19 RX ORDER — POTASSIUM CHLORIDE 20 MEQ/1
20 TABLET, EXTENDED RELEASE ORAL
Qty: 30 TABLET | Refills: 12 | Status: SHIPPED | OUTPATIENT
Start: 2022-01-19 | End: 2022-07-20 | Stop reason: ALTCHOICE

## 2022-01-19 NOTE — LETTER
1/20/2022    Patient: Lydia Epley   YOB: 1950   Date of Visit: 1/19/2022     Taylor Cano MD  [address] [address]  Via Fax: [phone]    Dear Taylor Cano MD,      Thank you for referring Ms. Mathew Ramirez to [facility] for evaluation. My notes for this consultation are attached. If you have questions, please do not hesitate to call me. I look forward to following your patient along with you.       Sincerely,    Linn Ryan MD

## 2022-01-19 NOTE — PROGRESS NOTES
HISTORY OF PRESENT ILLNESS  Solo Mendieta is a 66 y.o. female. HPI   f/u after  Last  visit for osteoporosis management  And  multinodular  goiter  From  July 2021     Here for Prolia shot           Jan 2021     She had seen pain specialist   She had MRI of spine which showed disc prolapse L4-L5         June 2020   To get Prolia shot today    In the interim,  She had surgery in May 2020 ,  for a large stone on right kidney, which was removed       Osteoporosis  Patient complains of osteoporosis. She was diagnosed with osteoporosis by bone density scan in nov 2017 . Patient denies history of fracture. The cause of osteoporosis is felt to be due to postmenopausal estrogen deficiency. She is currently being treated with calcium and vitamin D supplementation. She is currently being treated with bisphosphonates             Review of Systems   Constitutional: Negative. Musculoskeletal: Positive for back pain, joint pain and myalgias. Psychiatric/Behavioral: Negative for depression and memory loss. The patient does not have insomnia. Physical Exam   Constitutional: She is oriented to person, place, and time. She appears well-developed and well-nourished. Neck: Normal range of motion. Neck supple. No JVD present. No tracheal deviation present. thyromegaly present. Psychiatric: She has a normal mood and affect. Reviewed labs   Has 24 hr urine  Calcium - over 400 mg     ASSESSMENT and PLAN    1.  Osteoporosis :     Date : dec 2021  Bone DEXA  ap spine T-score -2.0 ; left femoral Neck T- score  -2.1, right femoral neck T-score -2.5   Date : dec 2019   Bone DEXA  ap spine T-score -1.9; left femoral Neck T- score  -2.1, right femoral neck T-score-2.2   Date : nov 2017  Bone DEXA  ap spine T-score -2.9; left femoral Neck T- score  -2.4, right femoral neck T-score -2.5  Date : jan 19 2012  Bone DEXA  ap spine T-score -1.5 ( L2 is -2.5); left femoral Neck T- score  -2.2, right femoral neck T-score-2.3    AGE, AND HYPERCALCIURIA      She finished  primary screening for secondary causes of osteopOrosis (thyroid, parathyroid,  CAME POSITIVE FOR hypercalciuria, vitamin D def, multiple myeloma, celiac sprue). Discussed  starting on Prolia  twice yearly once insurance approval is done. Medication benefits, side-effects and adverse effects discussed in detail with patient and family. Patient is agreeable and started her on First shot today April 2018 ; second in oc 2018 ; And third in April 2019  ; She is due for   Fourth shot  Nov 2019, 5th June 2020; 6th shot  dec /bc  2021  ;  7th shot   July 2021  ;  8th   Shot jan 19 2022  ( pt was under private plan in the past and she had ordered this way before thru that plan )    She is doing STABLE   on Prolia , she is long done with tamoxifen   Fall precautions discussed with the patient   Patient will take low doses of calcium and vit  D given her hypercalciuria and renal stone    2. H/o Breast cancer, left side  diagnosed in   2009   Had radiation treatments and lumpectomy   Tamoxifen ended 2014       3. HYPERCALCIURIA  : MORE THAN 400 IN 25 HOURS  In June 2018   STARTed ON HCTZ 25 MG A DAY  In June 2018   But decreased to HCTZ  To 12.5 mg a day    For hypokalemia  Asked  Pt to increase HCTZ 25 mg a day  As she needed more control on hypercalcemia   On  repeat 24 hr calcium after 2 months on 25 MG OF  hctz, great success with urine calcium to 214  From over 450     Jan 2021 - she still has low potassium   Start on kcl 10 meq a day       4.  Thyromegaly - usg  Oct 2018  Showed presence of 2 thyroid nodules on right side, Right thyroid lobe showed 2-3  nodules   # 1 nodule of size at right superior pole of size 1.37 cm by 1.32 cm by 1 cm, regular border, hypoechoic, not vascular   # 2 nodule of size 1.98  cm by 0.58 cm by 2.4cm regular border, hypoechoic, not vascular    Left thyroid lobe showed one nodules   # 1 nodule of size 1.48 cm by 0.62 cm by 0.5 cm     FNA  -  negative for cancer from December 2018  usg thyroid    July 2021  -  Left nodule  stayed the same   Right nodules - seems smaller by the usg done outside - defer FNA right now and plan on   Ultrasound  First   And  Then  FNA on  Right and left biopsies -     5. DM 2 - managed by pcp    6.   Renal calculus- s/p surgery  -         Reviewed results with patient and discussed the labs being ordered today/bnv  Patient voiced understanding of plan of care

## 2022-01-19 NOTE — PROGRESS NOTES
Prolia injection administer to left arm subcutaneously. No adverse reactions noted and patient did not voice any complaints. Prolia patient supplied.   [employer] Exp-02/2024 [id number] [facility]-[id number]

## 2022-01-19 NOTE — PROGRESS NOTES
1. Have you been to the ER, urgent care clinic since your last visit? No Hospitalized since your last visit? No    2. Have you seen or consulted any other health care providers outside of the [address] [place] [address] since your last visit? Include any pap smears or colon screening.  No    Wt Readings from Last 3 Encounters:   01/19/22 166 lb (75.3 kg)   07/19/21 164 lb (74.4 kg)   01/05/21 171 lb (77.6 kg)     Temp Readings from Last 3 Encounters:   01/19/22 98.4 °F (36.9 °C) (Temporal)   07/19/21 98.6 °F (37 °C)   01/05/21 98.4 °F (36.9 °C) (Oral)     BP Readings from Last 3 Encounters:   01/19/22 137/69   07/19/21 113/62   01/05/21 120/66     Pulse Readings from Last 3 Encounters:   01/19/22 87   07/19/21 80   01/05/21 97

## 2022-01-19 NOTE — PATIENT INSTRUCTIONS
SPECIFIC INSTRUCTIONS BELOW         Please call office if there is any injection site reactions like redness or swelling     Call 911 or go to Emergency room if you are feeling dizzy and developing shortness of breath          -------------PAY ATTENTION TO THESE GENERAL INSTRUCTIONS -----------------      - The medications prescribed at this visit will not be available at pharmacy until 6 pm       - YOUR MED LIST IS NOT UP TO DATE AS SOME CHANGES ARE BEING MADE AFTER THE VISIT - FOLLOW SPECIFIC INSTRUCTIONS  ABOVE     -ANY tests other than blood work, which you opt to do  outside the  [facility] imaging facilities, you are responsible for prior authorizations if  required    - [address] UP TO DATE ON YOUR AVS- PLEASE IGNORE     Results     *Normal results will not be notified by a phone call starting January 1 2021   *If you have an upcoming visit, the results will be discussed at the visit   *Please sign up for MY CHART if you want access to your lab and test results  *Abnormal results which require immediate attention will be notified by phone call   *Abnormal results which do not require immediate assistance will be notified in 1-2 weeks       Refills    -    have your pharmacy send us a refill request . Refills are done max for one year and a visit is a must before refills are extended    Follow up appointments -  highly encourage you to make it when you are checking out. We can accommodate you into the schedule based on your clinical situation, but not for extending refills beyond a year. Labs are important to give refills and is important to get labs before the visit     Phone calls  -  Allow  24 hrs.  for non-urgent calls to be returned  Prior authorization - It may take 2-4 weeks to process  Forms  -  FMLA, DMV etc., will take up to 2 weeks to process  Cancellations - please notify the office 2 days in advance   Samples  - will only be dispensed at visits       If not showing for the appointments and cancelling appointments within 24 hours are kept track of and three  of such situations in  two consecutive years will likely be considered for termination from the practice    -------------------------------------------------------------------------------------------------------------------

## 2022-03-19 PROBLEM — E11.9 CONTROLLED TYPE 2 DIABETES MELLITUS WITHOUT COMPLICATION, WITHOUT LONG-TERM CURRENT USE OF INSULIN (HCC): Status: ACTIVE | Noted: 2022-01-01

## 2022-03-19 PROBLEM — M81.0 SENILE OSTEOPOROSIS: Status: ACTIVE | Noted: 2018-06-13

## 2022-03-19 PROBLEM — C50.912 MALIGNANT NEOPLASM OF LEFT FEMALE BREAST (HCC): Status: ACTIVE | Noted: 2018-06-13

## 2022-03-19 PROBLEM — R82.994 HYPERCALCIURIA: Status: ACTIVE | Noted: 2018-06-13

## 2022-03-20 PROBLEM — I10 PRIMARY HYPERTENSION: Status: ACTIVE | Noted: 2022-01-01

## 2022-05-17 DIAGNOSIS — E55.9 VITAMIN D DEFICIENCY: ICD-10-CM

## 2022-05-17 DIAGNOSIS — E04.2 MULTINODULAR GOITER: ICD-10-CM

## 2022-05-17 DIAGNOSIS — R82.994 HYPERCALCIURIA: ICD-10-CM

## 2022-05-17 DIAGNOSIS — E04.9 THYROID GOITER: ICD-10-CM

## 2022-05-17 DIAGNOSIS — M81.0 SENILE OSTEOPOROSIS: ICD-10-CM

## 2022-05-17 RX ORDER — HYDROCHLOROTHIAZIDE 12.5 MG/1
12.5 TABLET ORAL DAILY
Qty: 90 TABLET | Refills: 3 | Status: SHIPPED | OUTPATIENT
Start: 2022-05-17

## 2022-07-13 NOTE — TELEPHONE ENCOUNTER
Patient would like return call for update on Prolia injection that should of been delivered first week of July concerned as her appointment is 06/20 if maybe a delay with approval.

## 2022-07-15 LAB
25(OH)D3+25(OH)D2 SERPL-MCNC: 29.2 NG/ML (ref 30–100)
ALBUMIN SERPL-MCNC: 4.4 G/DL (ref 3.7–4.7)
ALBUMIN/CREAT UR: 6 MG/G CREAT (ref 0–29)
ALBUMIN/GLOB SERPL: 1.5 {RATIO} (ref 1.2–2.2)
ALP SERPL-CCNC: 58 IU/L (ref 44–121)
ALT SERPL-CCNC: 13 IU/L (ref 0–32)
AST SERPL-CCNC: 18 IU/L (ref 0–40)
BILIRUB SERPL-MCNC: 0.2 MG/DL (ref 0–1.2)
BUN SERPL-MCNC: 11 MG/DL (ref 8–27)
BUN/CREAT SERPL: 18 (ref 12–28)
CALCIUM SERPL-MCNC: 9.8 MG/DL (ref 8.7–10.3)
CHLORIDE SERPL-SCNC: 100 MMOL/L (ref 96–106)
CHOLEST SERPL-MCNC: 179 MG/DL (ref 100–199)
CO2 SERPL-SCNC: 24 MMOL/L (ref 20–29)
CREAT SERPL-MCNC: 0.62 MG/DL (ref 0.57–1)
CREAT UR-MCNC: 124.4 MG/DL
EGFR: 93 ML/MIN/1.73
EST. AVERAGE GLUCOSE BLD GHB EST-MCNC: 131 MG/DL
GLOBULIN SER CALC-MCNC: 2.9 G/DL (ref 1.5–4.5)
GLUCOSE SERPL-MCNC: 140 MG/DL (ref 65–99)
HBA1C MFR BLD: 6.2 % (ref 4.8–5.6)
HDLC SERPL-MCNC: 55 MG/DL
IMP & REVIEW OF LAB RESULTS: NORMAL
LDLC SERPL CALC-MCNC: 102 MG/DL (ref 0–99)
MICROALBUMIN UR-MCNC: 7.8 UG/ML
POTASSIUM SERPL-SCNC: 3.7 MMOL/L (ref 3.5–5.2)
PROT SERPL-MCNC: 7.3 G/DL (ref 6–8.5)
SODIUM SERPL-SCNC: 141 MMOL/L (ref 134–144)
TRIGL SERPL-MCNC: 127 MG/DL (ref 0–149)
TSH SERPL DL<=0.005 MIU/L-ACNC: 0.94 UIU/ML (ref 0.45–4.5)
VLDLC SERPL CALC-MCNC: 22 MG/DL (ref 5–40)

## 2022-07-15 NOTE — TELEPHONE ENCOUNTER
[facility] does need a auth. Nurse needs to speak with the INS company to start the [place]. [phone] is the number to start the PA. Please advise if need more info.   TAMIKO

## 2022-07-16 RX ORDER — DENOSUMAB 60 MG/ML
INJECTION SUBCUTANEOUS
Qty: 1 EACH | Refills: 0 | Status: SHIPPED | OUTPATIENT
Start: 2022-07-16

## 2022-07-20 ENCOUNTER — OFFICE VISIT (OUTPATIENT)
Dept: ENDOCRINOLOGY | Age: 72
End: 2022-07-20
Payer: MEDICARE

## 2022-07-20 VITALS
SYSTOLIC BLOOD PRESSURE: 126 MMHG | TEMPERATURE: 98.3 F | HEART RATE: 87 BPM | BODY MASS INDEX: 29.91 KG/M2 | HEIGHT: 63 IN | OXYGEN SATURATION: 96 % | WEIGHT: 168.8 LBS | DIASTOLIC BLOOD PRESSURE: 63 MMHG

## 2022-07-20 DIAGNOSIS — E04.2 MULTINODULAR GOITER: Primary | ICD-10-CM

## 2022-07-20 DIAGNOSIS — E55.9 VITAMIN D DEFICIENCY: ICD-10-CM

## 2022-07-20 DIAGNOSIS — M81.0 SENILE OSTEOPOROSIS: ICD-10-CM

## 2022-07-20 DIAGNOSIS — R82.994 HYPERCALCIURIA: ICD-10-CM

## 2022-07-20 PROCEDURE — G8754 DIAS BP LESS 90: HCPCS | Performed by: INTERNAL MEDICINE

## 2022-07-20 PROCEDURE — G8752 SYS BP LESS 140: HCPCS | Performed by: INTERNAL MEDICINE

## 2022-07-20 PROCEDURE — G8536 NO DOC ELDER MAL SCRN: HCPCS | Performed by: INTERNAL MEDICINE

## 2022-07-20 PROCEDURE — 3017F COLORECTAL CA SCREEN DOC REV: CPT | Performed by: INTERNAL MEDICINE

## 2022-07-20 PROCEDURE — G8417 CALC BMI ABV UP PARAM F/U: HCPCS | Performed by: INTERNAL MEDICINE

## 2022-07-20 PROCEDURE — 1090F PRES/ABSN URINE INCON ASSESS: CPT | Performed by: INTERNAL MEDICINE

## 2022-07-20 PROCEDURE — 99214 OFFICE O/P EST MOD 30 MIN: CPT | Performed by: INTERNAL MEDICINE

## 2022-07-20 PROCEDURE — G8510 SCR DEP NEG, NO PLAN REQD: HCPCS | Performed by: INTERNAL MEDICINE

## 2022-07-20 PROCEDURE — 1101F PT FALLS ASSESS-DOCD LE1/YR: CPT | Performed by: INTERNAL MEDICINE

## 2022-07-20 PROCEDURE — G8427 DOCREV CUR MEDS BY ELIG CLIN: HCPCS | Performed by: INTERNAL MEDICINE

## 2022-07-20 PROCEDURE — 1123F ACP DISCUSS/DSCN MKR DOCD: CPT | Performed by: INTERNAL MEDICINE

## 2022-07-20 RX ORDER — ATORVASTATIN CALCIUM 40 MG/1
40 TABLET, FILM COATED ORAL DAILY
COMMUNITY
Start: 2022-05-19

## 2022-07-20 NOTE — PATIENT INSTRUCTIONS
SPECIFIC INSTRUCTIONS BELOW       Hctz 12.5 mg a day       -------------PAY ATTENTION TO THESE GENERAL INSTRUCTIONS -----------------      - The medications prescribed at this visit will not be available at pharmacy until 6 pm       - YOUR MED LIST IS NOT UP TO DATE AS SOME CHANGES ARE BEING MADE AFTER THE VISIT - FOLLOW SPECIFIC INSTRUCTIONS  ABOVE     -ANY tests other than blood work, which you opt to do  outside the  [facility] facilities, you are responsible for prior authorizations if  required    - [address] [facility]- PLEASE IGNORE     Results     *Normal results will not be notified by a phone call starting January 1 2021   *If you have an upcoming visit, the results will be discussed at the visit   *Please sign up for MY CHART if you want access to your lab and test results  *Abnormal results which require immediate attention will be notified by phone call   *Abnormal results which do not require immediate assistance will be notified in 1-2 weeks       Refills    -    have your pharmacy send us a refill request . Refills are done max for one year and a visit is a must before refills are extended    Follow up appointments -  highly encourage you to make it when you are checking out. We can accommodate you into the schedule based on your clinical situation, but not for extending refills beyond a year. Labs are important to give refills and is important to get labs before the visit     Phone calls  -  Allow  24 hrs.  for non-urgent calls to be returned  Prior authorization - It may take 2-4 weeks to process  Forms  -  FMLA, DMV etc., will take up to 2 weeks to process  Cancellations - please notify the office 2 days in advance   Samples  - will only be dispensed at visits       If not showing for the appointments and cancelling appointments within 24 hours are kept track of and three  of such situations in  two consecutive years will likely be considered for termination from the practice    -------------------------------------------------------------------------------------------------------------------

## 2022-07-20 NOTE — LETTER
7/23/2022    Patient: Omar Carlos   YOB: 1950   Date of Visit: 7/20/2022     Aleksandr Nobles MD  [address] [address]  Via Fax: [phone]    Dear Aleksandr Nobles MD,      Thank you for referring Ms. Severa Sil to [facility] for evaluation. My notes for this consultation are attached. If you have questions, please do not hesitate to call me. I look forward to following your patient along with you.       Sincerely,    Malachi Levy MD

## 2022-07-20 NOTE — PROGRESS NOTES
HISTORY OF PRESENT ILLNESS  María Burt is a 74 y.o. female. HPI   f/u after  Last  visit for osteoporosis management  And  multinodular  goiter  From  Jan 2022     Supposed to get prolia shot and it is on its way   Pt spoke to [facility] this morning and it is being shipped       Jan 2022      Here for Prolia shot       Jan 2021     She had seen pain specialist   She had MRI of spine which showed disc prolapse L4-L5         Osteoporosis  Patient complains of osteoporosis. She was diagnosed with osteoporosis by bone density scan in nov 2017 . Patient denies history of fracture. The cause of osteoporosis is felt to be due to postmenopausal estrogen deficiency. She is currently being treated with calcium and vitamin D supplementation. She is currently being treated with bisphosphonates         Review of Systems   Constitutional: Negative. Musculoskeletal: Positive for back pain, joint pain and myalgias. Psychiatric/Behavioral: Negative for depression and memory loss. The patient does not have insomnia. Physical Exam   Constitutional: She is oriented to person, place, and time. She appears well-developed and well-nourished. Neck: Normal range of motion. Neck supple. No JVD present. No tracheal deviation present. thyromegaly present. Psychiatric: She has a normal mood and affect. Reviewed labs   Has 24 hr urine  Calcium - over 400 mg     ASSESSMENT and PLAN    1.  Osteoporosis :   Date : dec 2021  Bone DEXA  ap spine T-score -2.0 ; left femoral Neck T- score  -2.1, right femoral neck T-score -2.5   Date : dec 2019   Bone DEXA  ap spine T-score -1.9; left femoral Neck T- score  -2.1, right femoral neck T-score-2.2   Date : nov 2017  Bone DEXA  ap spine T-score -2.9; left femoral Neck T- score  -2.4, right femoral neck T-score -2.5    Date : jan 19 2012  Bone DEXA  ap spine T-score -1.5 ( L2 is -2.5); left femoral Neck T- score  -2.2, right femoral neck T-score-2.3    AGE, AND HYPERCALCIURIA She finished  primary screening for secondary causes of osteopOrosis (thyroid, parathyroid,  CAME POSITIVE FOR hypercalciuria, vitamin D def, multiple myeloma, celiac sprue). Discussed  starting on Prolia  twice yearly once insurance approval is done. Medication benefits, side-effects and adverse effects discussed in detail with patient and family. Patient is agreeable and started her on First shot today April 2018 ; second in oc 2018 ; And third in April 2019  ; She is due for   Fourth shot  Nov 2019, 5th June 2020; 6th shot  dec /bc  2021  ;  7th shot   July 2021  ;  8th   Shot jan 19 2022  ( pt was under private plan in the past and she had ordered this way before also  )  Due for  9th shot     She is doing STABLE   on Prolia , she is long done with tamoxifen   Fall precautions discussed with the patient   Patient will take low doses of calcium and vit  D given her hypercalciuria and renal stone    2. H/o Breast cancer, left side  diagnosed in   2009   Had radiation treatments and lumpectomy   Tamoxifen ended 2014       3. HYPERCALCIURIA  : MORE THAN 400 IN 25 HOURS  In June 2018   STARTed ON HCTZ 25 MG A DAY  In June 2018   But decreased to HCTZ  To 12.5 mg a day    For hypokalemia  Asked  Pt to increase HCTZ 25 mg a day  As she needed more control on hypercalcemia   On  repeat 24 hr calcium after 2 months on 25 MG OF  hctz, great success with urine calcium to 214  From over 450     4.  Thyromegaly - usg  Oct 2018  Showed presence of 2 thyroid nodules on right side, Right thyroid lobe showed 2-3  nodules   # 1 nodule of size at right superior pole of size 1.37 cm by 1.32 cm by 1 cm, regular border, hypoechoic, not vascular   # 2 nodule of size 1.98  cm by 0.58 cm by 2.4cm regular border, hypoechoic, not vascular    Left thyroid lobe showed one nodules   # 1 nodule of size 1.48 cm by 0.62 cm by 0.5 cm     FNA  -  negative for cancer from December 2018  usg thyroid    dec 2021  :  Midline isthmus isoechoic to hypoechoic nodule 1.7 x 1.1 x 1.3 cm. TI-RADS  Category 3-4. Right lobe hypoechoic inferior nodule 1.1 x 0.9 x 1.1 cm. TI-RADS Category 4. Right lobe hyperechoic nodule 1.3 x 1.0 x 1.5 cm. TI-RADS Category 3. Right lobe heterogeneous hypoechoic nodule with macrocalcifications 1.5 x 1.4 x 1.6 cm. TI-RADS Category 4. Right lobe hypoechoic with punctate calcifications 0.8 x 0.6 x 0.8 cm. TI-RADS Category 5. Left lobe hypoechoic nodule 1.2 x 0.7 x 0.9 cm. TI-RADS category 4.    5.  DM 2 - managed by pcp    6.   Renal calculus- s/p surgery  -         Reviewed results with patient and discussed the labs being ordered today/bnv  Patient voiced understanding of plan of care

## 2022-07-28 ENCOUNTER — OFFICE VISIT (OUTPATIENT)
Dept: ENDOCRINOLOGY | Age: 72
End: 2022-07-28

## 2022-07-28 VITALS
DIASTOLIC BLOOD PRESSURE: 74 MMHG | TEMPERATURE: 98.6 F | HEART RATE: 77 BPM | SYSTOLIC BLOOD PRESSURE: 130 MMHG | RESPIRATION RATE: 18 BRPM | OXYGEN SATURATION: 96 %

## 2022-07-28 DIAGNOSIS — M81.0 SENILE OSTEOPOROSIS: Primary | ICD-10-CM

## 2022-07-28 RX ORDER — DENOSUMAB 60 MG/ML
60 INJECTION SUBCUTANEOUS ONCE
Qty: 1 ML | Refills: 0
Start: 2022-07-28 | End: 2022-07-28

## 2022-07-28 RX ORDER — DENOSUMAB 60 MG/ML
60 INJECTION SUBCUTANEOUS ONCE
Qty: 1 ML | Refills: 0 | Status: SHIPPED | COMMUNITY
Start: 2022-07-28 | End: 2022-07-28

## 2022-07-28 NOTE — PATIENT INSTRUCTIONS
SPECIFIC INSTRUCTIONS BELOW             -------------PAY ATTENTION TO THESE GENERAL INSTRUCTIONS -----------------      - The medications prescribed at this visit will not be available at pharmacy until 6 pm       - YOUR MED LIST IS NOT UP TO DATE AS SOME CHANGES ARE BEING MADE AFTER THE VISIT - FOLLOW SPECIFIC INSTRUCTIONS  ABOVE     -ANY tests other than blood work, which you opt to do  outside the  [facility] facilities, you are responsible for prior authorizations if  required    - [address] [facility]- PLEASE IGNORE     Results     *Normal results will not be notified by a phone call starting January 1 2021   *If you have an upcoming visit, the results will be discussed at the visit   *Please sign up for MY CHART if you want access to your lab and test results  *Abnormal results which require immediate attention will be notified by phone call   *Abnormal results which do not require immediate assistance will be notified in 1-2 weeks       Refills    -    have your pharmacy send us a refill request . Refills are done max for one year and a visit is a must before refills are extended    Follow up appointments -  highly encourage you to make it when you are checking out. We can accommodate you into the schedule based on your clinical situation, but not for extending refills beyond a year. Labs are important to give refills and is important to get labs before the visit     Phone calls  -  Allow  24 hrs.  for non-urgent calls to be returned  Prior authorization - It may take 2-4 weeks to process  Forms  -  FMLA, DMV etc., will take up to 2 weeks to process  Cancellations - please notify the office 2 days in advance   Samples  - will only be dispensed at visits       If not showing for the appointments and cancelling appointments within 24 hours are kept track of and three  of such situations in  two consecutive years will likely be considered for termination from the practice    -------------------------------------------------------------------------------------------------------------------

## 2022-07-28 NOTE — PROGRESS NOTES
60 mg/ml of Prolia  was administered to left deltoid subcut.  [address]: 15335-162-83, Lot: [id number], Exp: 10/2024 Manf: Debbie Baires.

## 2022-08-24 ENCOUNTER — OFFICE VISIT (OUTPATIENT)
Dept: ENDOCRINOLOGY | Age: 72
End: 2022-08-24
Payer: MEDICARE

## 2022-08-24 VITALS
WEIGHT: 169.4 LBS | HEART RATE: 97 BPM | DIASTOLIC BLOOD PRESSURE: 75 MMHG | OXYGEN SATURATION: 92 % | TEMPERATURE: 97.1 F | HEIGHT: 63 IN | SYSTOLIC BLOOD PRESSURE: 144 MMHG | BODY MASS INDEX: 30.02 KG/M2

## 2022-08-24 DIAGNOSIS — E04.2 MULTINODULAR GOITER: Primary | ICD-10-CM

## 2022-08-24 PROCEDURE — 10005 FNA BX W/US GDN 1ST LES: CPT | Performed by: INTERNAL MEDICINE

## 2022-08-24 RX ORDER — MELOXICAM 15 MG/1
TABLET ORAL
COMMUNITY
Start: 2022-06-25

## 2022-08-24 NOTE — PATIENT INSTRUCTIONS
SPECIFIC INSTRUCTIONS BELOW       Please take tylenol 500 mg or Motrin 400 mg (2 pills of 200 mg dose) OTC,  if there is any biopsy site pain    You can go back to your normal routine immediately    If you notice any dime sized redness, at the biopsy site, it is normal and do not worry     If you have more symptoms and signs requiring emergency assistance,  call 911   or go to Emergency room         -------------Jluis 1962 -----------------      - The medications prescribed at this visit will not be available at pharmacy until 6 pm       - YOUR MED LIST IS NOT UP TO DATE AS SOME CHANGES ARE BEING MADE AFTER THE VISIT - [address] [place] [address]     -ANY tests other than blood work, which you opt to do  outside the  [facility] imaging facilities, you are responsible for prior authorizations if  required    - HEALTH MAINTENANCE IS NOT GOING TO BE UP TO DATE ON YOUR AVS- PLEASE IGNORE     Results     *Normal results will not be notified by a phone call starting January 1 2021   *If you have an upcoming visit, the results will be discussed at the visit   *Please sign up for MY CHART if you want access to your lab and test results  *Abnormal results which require immediate attention will be notified by phone call   *Abnormal results which do not require immediate assistance will be notified in 1-2 weeks       Refills    -    have your pharmacy send us a refill request . Refills are done max for one year and a visit is a must before refills are extended    Follow up appointments -  highly encourage you to make it when you are checking out. We can accommodate you into the schedule based on your clinical situation, but not for extending refills beyond a year. Labs are important to give refills and is important to get labs before the visit     Phone calls  -  Allow  24 hrs.  for non-urgent calls to be returned  Prior authorization - It may take 2-4 weeks to process  Forms  -  FMLA, DMV etc., will take up to 2 weeks to process  Cancellations - please notify the office 2 days in advance   Samples  - will only be dispensed at visits       If not showing for the appointments and cancelling appointments within 24 hours are kept track of and three  of such situations in  two consecutive years will likely be considered for termination from the practice    -------------------------------------------------------------------------------------------------------------------

## 2022-08-24 NOTE — PROGRESS NOTES
[facility] DIABETES & ENDOCRINOLOGY  OFFICE PROCEDURE PROGRESS NOTE        Chart reviewed for the following:   Dirk Ruiz MD, have reviewed the History, Physical and updated the Allergic reactions for [address] performed immediately prior to start of procedure:   Dirk Ruiz MD, have performed the following reviews on [address] [place] [address] prior to the start of the procedure:            * Patient was identified by name and date of birth   * Agreement on procedure being performed was verified  * Risks and Benefits explained to the patient  * Procedure site verified and marked as necessary  * Patient was positioned for comfort  * Consent was signed and verified     Time: 130 pm    Pain scale : 0    Date of procedure: 8/24/2022    Procedure performed by:  Robert Varghese MD    Provider assisted by: Ms. Juan A Castillo LPN            Real time imaging was performed in both transverse and sagittal planes    Nodule size was : 1.7 cm  Following informed consent, a procedural pause was held to confirm patiient identity and site of biopsy. After sterile preparation, FNA guidance was performed using direct ultrasound guidance to confirm accurate needle placement. 4 aspirations were made using 25 G needles  Samples were submitted to cytology  Patient  tolerated procedure well without complications  After care instructions provided.       Pain scale post procedure : 3

## 2022-09-02 ENCOUNTER — DOCUMENTATION ONLY (OUTPATIENT)
Dept: ENDOCRINOLOGY | Age: 72
End: 2022-09-02

## 2022-09-02 NOTE — PROGRESS NOTES
Reviewed AFIRMA results from august 24 2022     Right mid lobe nodule  1.7 cm   ATUS    GE results are pending    Inform pt     Faiza Pepper MD

## 2022-09-07 ENCOUNTER — DOCUMENTATION ONLY (OUTPATIENT)
Dept: ENDOCRINOLOGY | Age: 72
End: 2022-09-07

## 2022-09-07 NOTE — PROGRESS NOTES
AFIRMA report from date   aug 24 2022 -       Right thyroid nodule - initial report was ATUS   ( undetermined ) but later the [address] [place] [address] ( cancer genes are negative )   Inform patient conclusion Is benign     Dontrell Burgos MD

## 2023-01-20 ENCOUNTER — OFFICE VISIT (OUTPATIENT)
Dept: ENDOCRINOLOGY | Age: 73
End: 2023-01-20
Payer: MEDICARE

## 2023-01-20 VITALS
BODY MASS INDEX: 30.51 KG/M2 | HEIGHT: 63 IN | DIASTOLIC BLOOD PRESSURE: 69 MMHG | TEMPERATURE: 98.2 F | SYSTOLIC BLOOD PRESSURE: 126 MMHG | OXYGEN SATURATION: 97 % | HEART RATE: 87 BPM | WEIGHT: 172.2 LBS | RESPIRATION RATE: 18 BRPM

## 2023-01-20 DIAGNOSIS — R82.994 HYPERCALCIURIA: ICD-10-CM

## 2023-01-20 DIAGNOSIS — E55.9 VITAMIN D DEFICIENCY: ICD-10-CM

## 2023-01-20 DIAGNOSIS — M81.0 SENILE OSTEOPOROSIS: ICD-10-CM

## 2023-01-20 DIAGNOSIS — E04.2 MULTINODULAR GOITER: Primary | ICD-10-CM

## 2023-01-20 RX ORDER — DENOSUMAB 60 MG/ML
INJECTION SUBCUTANEOUS
Qty: 1 EACH | Refills: 0 | Status: SHIPPED | OUTPATIENT
Start: 2023-01-20

## 2023-01-20 NOTE — PROGRESS NOTES
Barbara Noel is a 74 y.o. female here for   Chief Complaint   Patient presents with    Osteoporosis    Thyroid Problem       1. Have you been to the ER or an urgent care clinic since your last visit?  - no    2. Have you been hospitalized since your last visit? - no    3. Have you seen or consulted any other health care providers outside of the [address] [place] [address] since your last visit?   Include any pap smears or colon screening.- no

## 2023-01-20 NOTE — PATIENT INSTRUCTIONS
SPECIFIC INSTRUCTIONS BELOW     Prolia shot to be schedule any time after jan 28 2023     Stay on calcium and vit D     Stay on HCTZ 12.5 mg a day     -------------PAY ATTENTION TO THESE GENERAL INSTRUCTIONS -----------------      - The medications prescribed at this visit will not be available at pharmacy until 6 pm       - YOUR MED LIST IS NOT UP TO DATE AS SOME CHANGES ARE BEING MADE AFTER THE VISIT - FOLLOW SPECIFIC INSTRUCTIONS  ABOVE     -ANY tests other than blood work, which you opt to do  outside the  [facility] imaging facilities, you are responsible for prior authorizations if  required    - [address] UP TO DATE ON YOUR AVS- PLEASE IGNORE     Results     *Normal results will not be notified by a phone call starting January 1 2021   *If you have an upcoming visit, the results will be discussed at the visit   *Please sign up for MY CHART if you want access to your lab and test results  *Abnormal results which require immediate attention will be notified by phone call   *Abnormal results which do not require immediate assistance will be notified in 1-2 weeks       Refills    -    have your pharmacy send us a refill request . Refills are done max for one year and a visit is a must before refills are extended    Follow up appointments -  highly encourage you to make it when you are checking out. We can accommodate you into the schedule based on your clinical situation, but not for extending refills beyond a year. Labs are important to give refills and is important to get labs before the visit     Phone calls  -  Allow  24 hrs.  for non-urgent calls to be returned  Prior authorization - It may take 2-4 weeks to process  Forms  -  FMLA, DMV etc., will take up to 2 weeks to process  Cancellations - please notify the office 2 days in advance   Samples  - will only be dispensed at visits       If not showing for the appointments and cancelling appointments within 24 hours are kept track of and three  of such situations in  two consecutive years will likely be considered for termination from the practice    -------------------------------------------------------------------------------------------------------------------

## 2023-01-20 NOTE — PROGRESS NOTES
HISTORY OF PRESENT ILLNESS  Jose Strauss is a 74 y.o. female. HPI   f/u after  Last  visit for osteoporosis management  And  multinodular  goiter  From  July 2022       In the interim, had  FNA  of right mid  thyroid nodule - negative for cancer   Had prolia shot on  July 28 2022, due for It on jan 29th July 2022     Supposed to get prolia shot and it is on its way   Pt spoke to [facility] this morning and it is being shipped       Jan 2022      Here for Prolia shot       Jan 2021     She had seen pain specialist   She had MRI of spine which showed disc prolapse L4-L5         Osteoporosis  Patient complains of osteoporosis. She was diagnosed with osteoporosis by bone density scan in nov 2017 . Patient denies history of fracture. The cause of osteoporosis is felt to be due to postmenopausal estrogen deficiency. She is currently being treated with calcium and vitamin D supplementation. She is currently being treated with bisphosphonates         Review of Systems   Constitutional: Negative. Musculoskeletal: Positive for back pain, joint pain and myalgias. Psychiatric/Behavioral: Negative for depression and memory loss. The patient does not have insomnia. Physical Exam   Constitutional: She is oriented to person, place, and time. She appears well-developed and well-nourished. Neck: Normal range of motion. Neck supple. No JVD present. No tracheal deviation present. thyromegaly present. Psychiatric: She has a normal mood and affect. Reviewed labs   Has 24 hr urine  Calcium - over 400 mg     ASSESSMENT and PLAN    1.  Osteoporosis :   Date : dec 2021  Bone DEXA  ap spine T-score -2.0 ; left femoral Neck T- score  -2.1, right femoral neck T-score -2.5   Date : dec 2019   Bone DEXA  ap spine T-score -1.9; left femoral Neck T- score  -2.1, right femoral neck T-score-2.2   Date : nov 2017  Bone DEXA  ap spine T-score -2.9; left femoral Neck T- score  -2.4, right femoral neck T-score -2.5    Date : jan 19 2012  Bone DEXA  ap spine T-score -1.5 ( L2 is -2.5); left femoral Neck T- score  -2.2, right femoral neck T-score-2.3    AGE, AND HYPERCALCIURIA    She finished  primary screening for secondary causes of osteopOrosis (thyroid, parathyroid,  CAME POSITIVE FOR hypercalciuria, vitamin D def, multiple myeloma, celiac sprue). Discussed  starting on Prolia  twice yearly once insurance approval is done. Medication benefits, side-effects and adverse effects discussed in detail with patient and family. Patient is agreeable and started her on First shot today April 2018 ; second in oc 2018 ; And third in April 2019  ; She is due for   Fourth shot  Nov 2019, 5th June 2020; 6th shot  dec /bc  2021  ;  7th shot   July 2021  ;  8th   Shot jan 19 2022  ( pt was under private plan in the past and she had ordered this way before also  ) ; prolia  9th shot July 28 2022   Due for  10th shot     She is doing STABLE   on Prolia , she is long done with tamoxifen   Fall precautions discussed with the patient   Patient will take low doses of calcium and vit  D given her hypercalciuria and renal stone    2. H/o Breast cancer, left side  diagnosed in   2009   Had radiation treatments and lumpectomy   Tamoxifen ended 2014       3. HYPERCALCIURIA  : MORE THAN 400 IN 25 HOURS  In June 2018   STARTed ON HCTZ 25 MG A DAY  In June 2018   But decreased to HCTZ  To 12.5 mg a day  For hypokalemia  Asked  Pt to increase HCTZ 25 mg a day  As she needed more control on hypercalcemia   On  repeat 24 hr calcium after 2 months on 25 MG OF  hctz, great success with urine calcium to 214  From over 450     4. Thyromegaly - usg  Oct 2018  Showed presence of 2 thyroid nodules on right side, Right thyroid lobe showed 2-3  nodules   FNA  -  negative for cancer from December 2018  usg thyroid    dec 2021  :  Midline isthmus isoechoic to hypoechoic nodule 1.7 x 1.1 x 1.3 cm. TI-RADS  Category 3-4.   Right lobe hypoechoic inferior nodule 1.1 x 0.9 x 1.1 cm. TI-RADS Category 4. Right lobe hyperechoic nodule 1.3 x 1.0 x 1.5 cm. TI-RADS Category 3. Right lobe heterogeneous hypoechoic nodule with macrocalcifications 1.5 x 1.4 x 1.6 cm. TI-RADS Category 4. Right lobe hypoechoic with punctate calcifications 0.8 x 0.6 x 0.8 cm. TI-RADS Category 5. Left lobe hypoechoic nodule 1.2 x 0.7 x 0.9 cm. TI-RADS category 4. FNA  right mid lobe - negative by [facility]  august 2022  Will do follow up usg  in dec 2023  along with  DEXA due in dec 2023     5. DM 2 - managed by pcp    6.   Renal calculus- s/p surgery  -     F/up  in 6 months     Reviewed results with patient and discussed the labs being ordered today/bnv  Patient voiced understanding of plan of care

## 2023-02-10 ENCOUNTER — OFFICE VISIT (OUTPATIENT)
Dept: ENDOCRINOLOGY | Age: 73
End: 2023-02-10

## 2023-02-10 DIAGNOSIS — M81.0 SENILE OSTEOPOROSIS: Primary | ICD-10-CM

## 2023-02-10 RX ORDER — DENOSUMAB 60 MG/ML
60 INJECTION SUBCUTANEOUS ONCE
Qty: 1 ML | Refills: 0 | Status: SHIPPED | COMMUNITY
Start: 2023-02-11 | End: 2023-02-11

## 2023-02-10 NOTE — PROGRESS NOTES
Pt presents for Prolia injection. Injection administered to LEFT arm subcutaneously without difficulty. Patient tolerated injection well without complaints. No adverse reactions noted.   Lot #: [id number]  Exp date: 03/31/2025  [facility] [id number]  [facility]

## 2023-02-11 NOTE — PATIENT INSTRUCTIONS
SPECIFIC INSTRUCTIONS BELOW         Please call office if there is any injection site reactions like redness or swelling     Call 911 or go to Emergency room if you are feeling dizzy and developing shortness of breath          -------------PAY ATTENTION TO THESE GENERAL INSTRUCTIONS -----------------      - The medications prescribed at this visit will not be available at pharmacy until 6 pm       - YOUR MED LIST IS NOT UP TO DATE AS SOME CHANGES ARE BEING MADE AFTER THE VISIT - FOLLOW SPECIFIC INSTRUCTIONS  ABOVE     -ANY tests other than blood work, which you opt to do  outside the  [facility] imaging facilities, you are responsible for prior authorizations if  required    - [address] UP TO DATE ON YOUR AVS- PLEASE IGNORE     Results     *Normal results will not be notified by a phone call starting January 1 2021   *If you have an upcoming visit, the results will be discussed at the visit   *Please sign up for MY CHART if you want access to your lab and test results  *Abnormal results which require immediate attention will be notified by phone call   *Abnormal results which do not require immediate assistance will be notified in 1-2 weeks       Refills    -    have your pharmacy send us a refill request . Refills are done max for one year and a visit is a must before refills are extended    Follow up appointments -  highly encourage you to make it when you are checking out. We can accommodate you into the schedule based on your clinical situation, but not for extending refills beyond a year. Labs are important to give refills and is important to get labs before the visit     Phone calls  -  Allow  24 hrs.  for non-urgent calls to be returned  Prior authorization - It may take 2-4 weeks to process  Forms  -  FMLA, DMV etc., will take up to 2 weeks to process  Cancellations - please notify the office 2 days in advance   Samples  - will only be dispensed at visits       If not showing for the appointments and cancelling appointments within 24 hours are kept track of and three  of such situations in  two consecutive years will likely be considered for termination from the practice    -------------------------------------------------------------------------------------------------------------------

## 2023-04-29 DIAGNOSIS — R82.994 HYPERCALCIURIA: ICD-10-CM

## 2023-04-29 DIAGNOSIS — E55.9 VITAMIN D DEFICIENCY: ICD-10-CM

## 2023-04-29 DIAGNOSIS — E04.2 MULTINODULAR GOITER: ICD-10-CM

## 2023-04-29 DIAGNOSIS — M81.0 SENILE OSTEOPOROSIS: ICD-10-CM

## 2023-04-29 DIAGNOSIS — E04.9 THYROID GOITER: ICD-10-CM

## 2023-04-29 RX ORDER — HYDROCHLOROTHIAZIDE 12.5 MG/1
TABLET ORAL
Qty: 90 TABLET | Refills: 3 | Status: SHIPPED | OUTPATIENT
Start: 2023-04-29

## 2023-05-26 RX ORDER — MELOXICAM 15 MG/1
TABLET ORAL
COMMUNITY
Start: 2022-06-25

## 2023-05-26 RX ORDER — ATORVASTATIN CALCIUM 40 MG/1
40 TABLET, FILM COATED ORAL DAILY
COMMUNITY
Start: 2022-05-19

## 2023-05-26 RX ORDER — DENOSUMAB 60 MG/ML
INJECTION SUBCUTANEOUS
COMMUNITY
Start: 2023-01-20 | End: 2023-07-17

## 2023-05-26 RX ORDER — B-COMPLEX WITH VITAMIN C
TABLET ORAL
COMMUNITY

## 2023-05-26 RX ORDER — AMLODIPINE BESYLATE 10 MG/1
1 TABLET ORAL DAILY
COMMUNITY
Start: 2018-02-09

## 2023-05-26 RX ORDER — LISINOPRIL 5 MG/1
1 TABLET ORAL DAILY
COMMUNITY
Start: 2018-02-09

## 2023-05-26 RX ORDER — METFORMIN HYDROCHLORIDE 500 MG/1
TABLET, EXTENDED RELEASE ORAL
COMMUNITY
Start: 2018-04-03

## 2023-05-26 RX ORDER — HYDROCHLOROTHIAZIDE 12.5 MG/1
TABLET ORAL
COMMUNITY
Start: 2023-04-29

## 2023-07-17 DIAGNOSIS — M81.0 AGE-RELATED OSTEOPOROSIS WITHOUT CURRENT PATHOLOGICAL FRACTURE: Primary | ICD-10-CM

## 2023-07-17 RX ORDER — DENOSUMAB 60 MG/ML
INJECTION SUBCUTANEOUS
Qty: 1 ML | Refills: 0 | Status: SHIPPED | OUTPATIENT
Start: 2023-07-17

## 2023-08-15 DIAGNOSIS — R82.994 HYPERCALCIURIA: ICD-10-CM

## 2023-08-15 DIAGNOSIS — E55.9 VITAMIN D DEFICIENCY, UNSPECIFIED: ICD-10-CM

## 2023-08-15 DIAGNOSIS — E04.2 MULTINODULAR GOITER: ICD-10-CM

## 2023-08-15 DIAGNOSIS — M81.0 SENILE OSTEOPOROSIS: ICD-10-CM

## 2023-08-15 DIAGNOSIS — M81.0 AGE-RELATED OSTEOPOROSIS WITHOUT CURRENT PATHOLOGICAL FRACTURE: Primary | ICD-10-CM

## 2023-08-22 ENCOUNTER — OFFICE VISIT (OUTPATIENT)
Age: 73
End: 2023-08-22
Payer: MEDICARE

## 2023-08-22 VITALS
TEMPERATURE: 97.5 F | OXYGEN SATURATION: 96 % | RESPIRATION RATE: 18 BRPM | HEART RATE: 91 BPM | BODY MASS INDEX: 31.18 KG/M2 | DIASTOLIC BLOOD PRESSURE: 66 MMHG | HEIGHT: 63 IN | WEIGHT: 176 LBS | SYSTOLIC BLOOD PRESSURE: 119 MMHG

## 2023-08-22 DIAGNOSIS — M81.0 AGE-RELATED OSTEOPOROSIS WITHOUT CURRENT PATHOLOGICAL FRACTURE: Primary | ICD-10-CM

## 2023-08-22 DIAGNOSIS — E04.2 MULTINODULAR GOITER: ICD-10-CM

## 2023-08-22 DIAGNOSIS — E55.9 VITAMIN D DEFICIENCY, UNSPECIFIED: ICD-10-CM

## 2023-08-22 PROCEDURE — 99214 OFFICE O/P EST MOD 30 MIN: CPT | Performed by: INTERNAL MEDICINE

## 2023-08-22 PROCEDURE — 3074F SYST BP LT 130 MM HG: CPT | Performed by: INTERNAL MEDICINE

## 2023-08-22 PROCEDURE — 3017F COLORECTAL CA SCREEN DOC REV: CPT | Performed by: INTERNAL MEDICINE

## 2023-08-22 PROCEDURE — G8427 DOCREV CUR MEDS BY ELIG CLIN: HCPCS | Performed by: INTERNAL MEDICINE

## 2023-08-22 PROCEDURE — 1123F ACP DISCUSS/DSCN MKR DOCD: CPT | Performed by: INTERNAL MEDICINE

## 2023-08-22 PROCEDURE — G8399 PT W/DXA RESULTS DOCUMENT: HCPCS | Performed by: INTERNAL MEDICINE

## 2023-08-22 PROCEDURE — 4004F PT TOBACCO SCREEN RCVD TLK: CPT | Performed by: INTERNAL MEDICINE

## 2023-08-22 PROCEDURE — G8417 CALC BMI ABV UP PARAM F/U: HCPCS | Performed by: INTERNAL MEDICINE

## 2023-08-22 PROCEDURE — 1090F PRES/ABSN URINE INCON ASSESS: CPT | Performed by: INTERNAL MEDICINE

## 2023-08-22 PROCEDURE — 3078F DIAST BP <80 MM HG: CPT | Performed by: INTERNAL MEDICINE

## 2023-08-22 NOTE — PATIENT INSTRUCTIONS
SPECIFIC INSTRUCTIONS BELOW         Stay on calcium and Vit D       -------------PAY ATTENTION TO THESE GENERAL INSTRUCTIONS -----------------      - The medications prescribed at this visit will not be available at pharmacy until 6 pm       - YOUR MED LIST IS NOT UP TO DATE AS SOME CHANGES ARE BEING MADE AFTER THE VISIT - FOLLOW SPECIFIC INSTRUCTIONS  ABOVE     -ANY tests other than blood work, which you opt to do  outside the  [facility] imaging facilities, you are responsible for prior authorizations if  required    - [address] [place] [address] Drive AVS- PLEASE IGNORE     Results     *Normal results will not be notified by a phone call starting January 1 2021   *If you have an upcoming visit, the results will be discussed at the visit   *Please sign up for MY CHART if you want access to your lab and test results  *Abnormal results which require immediate attention will be notified by phone call   *Abnormal results which do not require immediate assistance will be notified in 1-2 weeks       Refills    -    have your pharmacy send us a refill request . Refills are done max for one year and a visit is a must before refills are extended    Follow up appointments -  highly encourage you to make it when you are checking out. We can accommodate you into the schedule based on your clinical situation, but not for extending refills beyond a year. Labs are important to give refills and is important to get labs before the visit     Phone calls  -  Allow  24 hrs.  for non-urgent calls to be returned  Prior authorization - It may take 2-4 weeks to process  Forms  -  FMLA, DMV etc., will take up to 2 weeks to process  Cancellations - please notify the office 2 days in advance   Samples  - will only be dispensed at visits       If not showing for the appointments and cancelling appointments within 24 hours are kept track of and three  of such situations in  two consecutive years will likely be

## 2023-08-22 NOTE — PROGRESS NOTES
Bea Gomez is a 74 y.o. female here for   Chief Complaint   Patient presents with    Osteoporosis       1. Have you been to the ER or an urgent care clinic since your last visit?  - no    2. Have you been hospitalized since your last visit? - no    3. Have you seen or consulted any other health care providers outside of the [address] [place] [address] since your last visit?   Include any pap smears or colon screening.- no

## 2023-08-22 NOTE — PROGRESS NOTES
Tarik Pool MD FACE           HISTORY OF PRESENT ILLNESS  Keshawn Cazares is a 74  y.o. female. HPI   f/u after  Last  visit for osteoporosis management  And  multinodular  goiter  From  Jan 2023     She is here to get prolia shot , but she has not okayed to her mail order to send it here   She called them in front of me and they will be shipping it  to her soon      Jan 2023     In the interim, had  FNA  of right mid  thyroid nodule - negative for cancer   Had prolia shot on  July 28 2022, due for It on jan 29th Jan 2021     She had seen pain specialist   She had MRI of spine which showed disc prolapse L4-L5         Osteoporosis  Patient complains of osteoporosis. She was diagnosed with osteoporosis by bone density scan in nov 2017 . Patient denies history of fracture. The cause of osteoporosis is felt to be due to postmenopausal estrogen deficiency. She is currently being treated with calcium and vitamin D supplementation. She is currently being treated with bisphosphonates         Review of Systems   Constitutional: Negative. Musculoskeletal: Positive for back pain, joint pain and myalgias. Psychiatric/Behavioral: Negative for depression and memory loss. The patient does not have insomnia. Physical Exam   Constitutional: She is oriented to person, place, and time. She appears well-developed and well-nourished. Neck: Normal range of motion. Neck supple. No JVD present. No tracheal deviation present. thyromegaly present. Psychiatric: She has a normal mood and affect.        Reviewed labs   Has 24 hr urine  Calcium - over 400 mg     Labs    Lab Results   Component Value Date     08/15/2023    K 3.5 08/15/2023     08/15/2023    CO2 29 08/15/2023    BUN 11 08/15/2023    CREATININE 0.72 08/15/2023    GLUCOSE 107 (H) 08/15/2023    CALCIUM 9.4 08/15/2023    PROT 7.6 08/15/2023    LABALBU 3.8 08/15/2023    BILITOT 0.3

## 2023-08-31 ENCOUNTER — TELEPHONE (OUTPATIENT)
Age: 73
End: 2023-08-31

## 2023-09-07 ENCOUNTER — OFFICE VISIT (OUTPATIENT)
Age: 73
End: 2023-09-07

## 2023-09-07 VITALS
TEMPERATURE: 98.4 F | OXYGEN SATURATION: 97 % | RESPIRATION RATE: 18 BRPM | SYSTOLIC BLOOD PRESSURE: 137 MMHG | BODY MASS INDEX: 31.4 KG/M2 | HEIGHT: 63 IN | WEIGHT: 177.2 LBS | HEART RATE: 74 BPM | DIASTOLIC BLOOD PRESSURE: 68 MMHG

## 2023-09-07 DIAGNOSIS — M81.0 AGE-RELATED OSTEOPOROSIS WITHOUT CURRENT PATHOLOGICAL FRACTURE: Primary | ICD-10-CM

## 2023-09-07 NOTE — PROGRESS NOTES
Pt supply Prolia    Prolia inj given in Left arm subcutaneous by nurse INEZ Pruett LPN. Pt tolerated well.   Lot #: [id number]  Exp Date: 8/31/25 [address]: [id number]  [facility]

## 2024-02-05 DIAGNOSIS — M81.0 AGE-RELATED OSTEOPOROSIS WITHOUT CURRENT PATHOLOGICAL FRACTURE: ICD-10-CM

## 2024-02-05 RX ORDER — DENOSUMAB 60 MG/ML
INJECTION SUBCUTANEOUS
Qty: 1 ML | Refills: 0 | Status: SHIPPED | OUTPATIENT
Start: 2024-02-05

## 2024-07-14 DIAGNOSIS — E04.2 MULTINODULAR GOITER: Primary | ICD-10-CM

## 2024-07-16 RX ORDER — HYDROCHLOROTHIAZIDE 12.5 MG/1
TABLET ORAL
Qty: 90 TABLET | Refills: 0 | Status: SHIPPED | OUTPATIENT
Start: 2024-07-16